# Patient Record
Sex: FEMALE | Race: WHITE | NOT HISPANIC OR LATINO | Employment: OTHER | URBAN - NONMETROPOLITAN AREA
[De-identification: names, ages, dates, MRNs, and addresses within clinical notes are randomized per-mention and may not be internally consistent; named-entity substitution may affect disease eponyms.]

---

## 2017-05-09 ENCOUNTER — TRANSCRIBE ORDERS (OUTPATIENT)
Dept: ADMINISTRATIVE | Facility: HOSPITAL | Age: 77
End: 2017-05-09

## 2017-05-09 DIAGNOSIS — Z12.31 VISIT FOR SCREENING MAMMOGRAM: Primary | ICD-10-CM

## 2017-05-12 ENCOUNTER — HOSPITAL ENCOUNTER (OUTPATIENT)
Dept: GENERAL RADIOLOGY | Facility: HOSPITAL | Age: 77
Discharge: HOME | End: 2017-05-12
Attending: INTERNAL MEDICINE | Admitting: INTERNAL MEDICINE

## 2017-05-12 ENCOUNTER — TRANSCRIBE ORDERS (OUTPATIENT)
Dept: LAB | Facility: HOSPITAL | Age: 77
End: 2017-05-12

## 2017-05-12 DIAGNOSIS — E83.52 HYPERCALCEMIA: Primary | ICD-10-CM

## 2017-05-12 DIAGNOSIS — E83.52 HYPERCALCEMIA: ICD-10-CM

## 2017-05-12 PROCEDURE — 71020 HC CHEST PA AND LATERAL: CPT

## 2017-05-12 PROCEDURE — 71020 XR CHEST PA AND LATERAL: CPT | Performed by: RADIOLOGY

## 2017-05-26 ENCOUNTER — HOSPITAL ENCOUNTER (OUTPATIENT)
Dept: MAMMOGRAPHY | Facility: HOSPITAL | Age: 77
Discharge: HOME | End: 2017-05-26
Attending: INTERNAL MEDICINE | Admitting: INTERNAL MEDICINE

## 2017-05-26 DIAGNOSIS — Z12.31 VISIT FOR SCREENING MAMMOGRAM: ICD-10-CM

## 2017-05-26 PROCEDURE — G0202 SCR MAMMO BI INCL CAD: HCPCS | Performed by: RADIOLOGY

## 2017-05-26 PROCEDURE — G0202 SCR MAMMO BI INCL CAD: HCPCS

## 2017-05-26 PROCEDURE — 77063 BREAST TOMOSYNTHESIS BI: CPT

## 2017-05-26 PROCEDURE — 77063 BREAST TOMOSYNTHESIS BI: CPT | Performed by: RADIOLOGY

## 2017-07-20 ENCOUNTER — OFFICE VISIT (OUTPATIENT)
Dept: CARDIOLOGY | Facility: CLINIC | Age: 77
End: 2017-07-20

## 2017-07-20 VITALS
HEART RATE: 57 BPM | HEIGHT: 60 IN | RESPIRATION RATE: 16 BRPM | SYSTOLIC BLOOD PRESSURE: 124 MMHG | BODY MASS INDEX: 30.04 KG/M2 | DIASTOLIC BLOOD PRESSURE: 75 MMHG | WEIGHT: 153 LBS

## 2017-07-20 DIAGNOSIS — R07.9 CHEST PAIN, UNSPECIFIED TYPE: Primary | ICD-10-CM

## 2017-07-20 DIAGNOSIS — I10 ESSENTIAL HYPERTENSION: ICD-10-CM

## 2017-07-20 PROCEDURE — 93000 ELECTROCARDIOGRAM COMPLETE: CPT | Performed by: INTERNAL MEDICINE

## 2017-07-20 PROCEDURE — 99213 OFFICE O/P EST LOW 20 MIN: CPT | Performed by: INTERNAL MEDICINE

## 2017-07-20 NOTE — PROGRESS NOTES
"Bridget Segal MD  Juan José Byrdjuhi  1940 07/20/2017    Patient Active Problem List   Diagnosis   • Dyslipidemia   • Hypertension   • Chest pain       Dear Bridget Segal MD:    Subjective     Juan José Meza is a 76 y.o. female with the problems as listed above, presents      History of Present Illness:Ms. Womack is a pleasant 75-year-old  female with history of chest pains but no documented significant coronary artery disease.  She is here for her regular cardiology follow-up.  She has not had any further significant chest pain since the last visit.  She denies any significant dyspnea, PND, orthopnea or pedal edema.  She has some mild palpitations at times.  Overall she's been doing well.        Allergies   Allergen Reactions   • No Known Drug Allergy    :      Current Outpatient Prescriptions:   •  pantoprazole (PROTONIX) 40 MG EC tablet, Take 40 mg by mouth daily., Disp: , Rfl:   •  Ped Multivitamins-Fl-Iron (MULTIVITAMIN WITH FLUORIDE/IRON) 0.25-10 MG/ML solution solution, Take  by mouth daily., Disp: , Rfl:       The following portions of the patient's history were reviewed and updated as appropriate: allergies, current medications, past family history, past medical history, past social history, past surgical history and problem list.    Social History   Substance Use Topics   • Smoking status: Never Smoker   • Smokeless tobacco: Never Used   • Alcohol use No       Review of Systems   Constitution: Negative for chills and fever.   HENT: Negative for headaches, nosebleeds and sore throat.    Respiratory: Negative for cough, hemoptysis and wheezing.    Gastrointestinal: Negative for abdominal pain, hematemesis, hematochezia, melena, nausea and vomiting.   Genitourinary: Negative for dysuria and hematuria.       Objective   Vitals:    07/20/17 1423   BP: 124/75   Pulse: 57   Resp: 16   Weight: 153 lb (69.4 kg)   Height: 60\" (152.4 cm)     Body mass index is 29.88 kg/(m^2).        Physical Exam "   Constitutional: She is oriented to person, place, and time. She appears well-developed and well-nourished.   HENT:   Head: Normocephalic.   Eyes: Conjunctivae and EOM are normal.   Neck: Normal range of motion. Neck supple. No JVD present. No tracheal deviation present. No thyromegaly present.   Cardiovascular: Normal rate and regular rhythm.  Exam reveals no gallop and no friction rub.    No murmur heard.  Pulmonary/Chest: Breath sounds normal. No respiratory distress. She has no wheezes. She has no rales.   Abdominal: Soft. Bowel sounds are normal. She exhibits no mass. There is no tenderness.   Musculoskeletal: She exhibits no edema.   Neurological: She is alert and oriented to person, place, and time. No cranial nerve deficit.   Skin: Skin is warm and dry.   Psychiatric: She has a normal mood and affect.       Echo   No results found for: ECHOEFEST    ECG 12 Lead  Date/Time: 7/20/2017 2:26 PM  Performed by: EDWIN HINDS  Authorized by: EDWIN HINDS   Rhythm: sinus rhythm  Conduction: conduction normal  ST Segments: ST segments normal  Other: no other findings              Assessment/Plan      1. Chest pain, unspecified type , Resolved.      2. Essential hypertension, Controlled.             Recommendations:    1. Continue same medications  2. Since she is doing well, I told her to follow-up with her primary M.D. Dr. Segal and will be glad to see her back as needed from the cardiac standpoint.       No Follow-up on file.    As always, I appreciate very much the opportunity to participate in the cardiovascular care of your patients.      With Best Regards,    Edwin Hinds MD, [facility]    Dragon disclaimer:  Much of this encounter note is an electronic transcription/translation of spoken language to printed text. The electronic translation of spoken language may permit erroneous, or at times, nonsensical words or phrases to be inadvertently transcribed; Although I have reviewed the note for such errors,  some may still exist.

## 2018-11-19 ENCOUNTER — HOSPITAL ENCOUNTER (OUTPATIENT)
Dept: MAMMOGRAPHY | Facility: HOSPITAL | Age: 78
Discharge: HOME | End: 2018-11-19
Admitting: INTERNAL MEDICINE

## 2018-11-19 DIAGNOSIS — Z12.39 SCREENING BREAST EXAMINATION: ICD-10-CM

## 2018-11-19 PROCEDURE — 77067 SCR MAMMO BI INCL CAD: CPT

## 2018-11-19 PROCEDURE — 77063 BREAST TOMOSYNTHESIS BI: CPT | Performed by: RADIOLOGY

## 2018-11-19 PROCEDURE — 77063 BREAST TOMOSYNTHESIS BI: CPT

## 2018-11-19 PROCEDURE — 77067 SCR MAMMO BI INCL CAD: CPT | Performed by: RADIOLOGY

## 2019-10-17 ENCOUNTER — APPOINTMENT (OUTPATIENT)
Dept: BONE DENSITY | Facility: HOSPITAL | Age: 79
End: 2019-10-17

## 2019-11-25 ENCOUNTER — HOSPITAL ENCOUNTER (OUTPATIENT)
Dept: MAMMOGRAPHY | Facility: HOSPITAL | Age: 79
End: 2019-11-25

## 2019-11-25 ENCOUNTER — HOSPITAL ENCOUNTER (OUTPATIENT)
Dept: BONE DENSITY | Facility: HOSPITAL | Age: 79
End: 2019-11-25

## 2019-12-18 ENCOUNTER — HOSPITAL ENCOUNTER (OUTPATIENT)
Dept: MAMMOGRAPHY | Facility: HOSPITAL | Age: 79
Discharge: HOME | End: 2019-12-18
Admitting: INTERNAL MEDICINE

## 2019-12-18 ENCOUNTER — HOSPITAL ENCOUNTER (OUTPATIENT)
Dept: BONE DENSITY | Facility: HOSPITAL | Age: 79
Discharge: HOME | End: 2019-12-18

## 2019-12-18 DIAGNOSIS — Z12.31 SCREENING MAMMOGRAM FOR HIGH-RISK PATIENT: ICD-10-CM

## 2019-12-18 DIAGNOSIS — M81.0 AGE-RELATED OSTEOPOROSIS WITHOUT CURRENT PATHOLOGICAL FRACTURE: ICD-10-CM

## 2019-12-18 PROCEDURE — 77063 BREAST TOMOSYNTHESIS BI: CPT

## 2019-12-18 PROCEDURE — 77080 DXA BONE DENSITY AXIAL: CPT | Performed by: RADIOLOGY

## 2019-12-18 PROCEDURE — 77067 SCR MAMMO BI INCL CAD: CPT | Performed by: RADIOLOGY

## 2019-12-18 PROCEDURE — 77080 DXA BONE DENSITY AXIAL: CPT

## 2019-12-18 PROCEDURE — 77063 BREAST TOMOSYNTHESIS BI: CPT | Performed by: RADIOLOGY

## 2019-12-18 PROCEDURE — 77067 SCR MAMMO BI INCL CAD: CPT

## 2020-07-01 ENCOUNTER — TRANSCRIBE ORDERS (OUTPATIENT)
Dept: ADMINISTRATIVE | Facility: HOSPITAL | Age: 80
End: 2020-07-01

## 2020-07-01 DIAGNOSIS — R79.89 HYPOURICEMIA: Primary | ICD-10-CM

## 2020-07-08 ENCOUNTER — APPOINTMENT (OUTPATIENT)
Dept: ULTRASOUND IMAGING | Facility: HOSPITAL | Age: 80
End: 2020-07-08

## 2020-07-21 ENCOUNTER — HOSPITAL ENCOUNTER (OUTPATIENT)
Dept: ULTRASOUND IMAGING | Facility: HOSPITAL | Age: 80
Discharge: HOME | End: 2020-07-21
Admitting: INTERNAL MEDICINE

## 2020-07-21 ENCOUNTER — TRANSCRIBE ORDERS (OUTPATIENT)
Dept: ADMINISTRATIVE | Facility: HOSPITAL | Age: 80
End: 2020-07-21

## 2020-07-21 ENCOUNTER — HOSPITAL ENCOUNTER (OUTPATIENT)
Dept: GENERAL RADIOLOGY | Facility: HOSPITAL | Age: 80
Discharge: HOME | End: 2020-07-21

## 2020-07-21 DIAGNOSIS — R06.02 SHORTNESS OF BREATH: ICD-10-CM

## 2020-07-21 DIAGNOSIS — R06.02 SHORTNESS OF BREATH: Primary | ICD-10-CM

## 2020-07-21 DIAGNOSIS — R79.89 HYPOURICEMIA: ICD-10-CM

## 2020-07-21 PROCEDURE — 71046 X-RAY EXAM CHEST 2 VIEWS: CPT | Performed by: RADIOLOGY

## 2020-07-21 PROCEDURE — 76700 US EXAM ABDOM COMPLETE: CPT | Performed by: RADIOLOGY

## 2020-07-21 PROCEDURE — 76700 US EXAM ABDOM COMPLETE: CPT

## 2020-07-21 PROCEDURE — 71046 X-RAY EXAM CHEST 2 VIEWS: CPT

## 2021-02-17 DIAGNOSIS — Z23 IMMUNIZATION DUE: ICD-10-CM

## 2021-04-09 ENCOUNTER — TRANSCRIBE ORDERS (OUTPATIENT)
Dept: ADMINISTRATIVE | Facility: HOSPITAL | Age: 81
End: 2021-04-09

## 2021-04-09 DIAGNOSIS — R31.9 HEMATURIA SYNDROME: Primary | ICD-10-CM

## 2021-04-12 ENCOUNTER — OFFICE VISIT (OUTPATIENT)
Dept: CARDIOLOGY | Facility: CLINIC | Age: 81
End: 2021-04-12

## 2021-04-12 VITALS
TEMPERATURE: 97.8 F | DIASTOLIC BLOOD PRESSURE: 68 MMHG | HEIGHT: 61 IN | HEART RATE: 61 BPM | RESPIRATION RATE: 16 BRPM | BODY MASS INDEX: 28.74 KG/M2 | WEIGHT: 152.2 LBS | SYSTOLIC BLOOD PRESSURE: 158 MMHG

## 2021-04-12 DIAGNOSIS — E78.2 MIXED HYPERLIPIDEMIA: ICD-10-CM

## 2021-04-12 DIAGNOSIS — I10 ESSENTIAL HYPERTENSION: ICD-10-CM

## 2021-04-12 DIAGNOSIS — R07.2 PRECORDIAL CHEST PAIN: Primary | ICD-10-CM

## 2021-04-12 DIAGNOSIS — R00.2 PALPITATIONS: ICD-10-CM

## 2021-04-12 PROCEDURE — 93000 ELECTROCARDIOGRAM COMPLETE: CPT | Performed by: SPECIALIST

## 2021-04-12 PROCEDURE — 99204 OFFICE O/P NEW MOD 45 MIN: CPT | Performed by: SPECIALIST

## 2021-04-12 RX ORDER — CETIRIZINE HYDROCHLORIDE 10 MG/1
10 TABLET ORAL DAILY
COMMUNITY

## 2021-04-12 RX ORDER — ASPIRIN 81 MG/1
81 TABLET ORAL DAILY
COMMUNITY

## 2021-04-12 RX ORDER — AMLODIPINE BESYLATE 5 MG/1
5 TABLET ORAL DAILY
Qty: 30 TABLET | Refills: 11 | Status: SHIPPED | OUTPATIENT
Start: 2021-04-12 | End: 2021-07-27 | Stop reason: SDUPTHER

## 2021-04-12 NOTE — PROGRESS NOTES
"Subjective   Initial consultation, chest pain, palpitations  Juan José Meza is a 80 y.o. female who presents to day for Med Management (list provided), Palpitations (fluttering), and Chest Pain (\"some\").    CHIEF COMPLIANT  Chief Complaint   Patient presents with   • Med Management     list provided   • Palpitations     fluttering   • Chest Pain     \"some\"       Active Problems:  Problem List Items Addressed This Visit        Cardiac and Vasculature    Precordial chest pain - Primary    Relevant Orders    ECG 12 Lead (Completed)    Stress Test With Myocardial Perfusion One Day    Adult Transthoracic Echo Complete w/ Color, Spectral and Contrast if necessary per protocol    Palpitations    Relevant Orders    Adult Transthoracic Echo Complete w/ Color, Spectral and Contrast if necessary per protocol    Cardiac Event Monitor    Essential hypertension    Relevant Medications    amLODIPine (NORVASC) 5 MG tablet    Other Relevant Orders    Comprehensive Metabolic Panel    Mixed hyperlipidemia    Relevant Orders    Lipid Panel    Comprehensive Metabolic Panel          HPI  HPI  For about 6 months or so she has been having episodes of palpitations which usually last for few seconds feels like the heart speeding up and pounding it feels regular and usually last for few seconds it happens on average about every 3 to 4 days usually not associated with symptoms denies and this is a pleasant syncope in 4/3 so months is having retrosternal chest discomfort does not radiate to his relatively short-lived no relationship to food or exertion happens about once or twice a week she denies any shortness of breath no edema never smoked he has hypertension also has hyperlipidemia no diabetes her father had a heart attack is a 75 mother also have cardiac problems unknown nature  PRIOR MEDS  Current Outpatient Medications on File Prior to Visit   Medication Sig Dispense Refill   • aspirin 81 MG EC tablet Take 81 mg by mouth Daily.     • " cetirizine (zyrTEC) 10 MG tablet Take 10 mg by mouth Daily.     • Cyanocobalamin (HM SUPER VITAMIN B12 PO) Take  by mouth Daily.     • Multiple Vitamins-Minerals (Emergen-C Immune) pack Take  by mouth As Needed.     • pantoprazole (PROTONIX) 40 MG EC tablet Take 40 mg by mouth daily.     • Ped Multivitamins-Fl-Iron (MULTIVITAMIN WITH FLUORIDE/IRON) 0.25-10 MG/ML solution solution Take  by mouth daily.       No current facility-administered medications on file prior to visit.       ALLERGIES  No known drug allergy    HISTORY  Past Medical History:   Diagnosis Date   • Chest pain    • Dyslipidemia    • Environmental allergies    • Hypertension        Social History     Socioeconomic History   • Marital status:      Spouse name: Not on file   • Number of children: Not on file   • Years of education: Not on file   • Highest education level: Not on file   Tobacco Use   • Smoking status: Never Smoker   • Smokeless tobacco: Never Used   Substance and Sexual Activity   • Alcohol use: No   • Drug use: No       Family History   Problem Relation Age of Onset   • Heart disease Mother    • Heart attack Mother    • Heart attack Father    • Heart disease Maternal Uncle    • Breast cancer Neg Hx        Review of Systems   Constitutional: Negative for activity change and appetite change.   HENT: Positive for hearing loss. Negative for congestion.    Respiratory: Positive for chest tightness. Negative for apnea, cough, choking, shortness of breath, wheezing and stridor.    Cardiovascular: Positive for palpitations. Negative for chest pain and leg swelling.   Gastrointestinal: Negative for abdominal distention and abdominal pain.   Endocrine: Negative for cold intolerance and heat intolerance.   Genitourinary: Positive for frequency.   Musculoskeletal: Negative for gait problem.   Skin: Negative for color change and pallor.   Neurological: Negative for dizziness.   Psychiatric/Behavioral: Negative for agitation and behavioral  "problems.       Objective     VITALS: /68 (BP Location: Left arm)   Pulse 61   Temp 97.8 °F (36.6 °C)   Resp 16   Ht 154.9 cm (61\")   Wt 69 kg (152 lb 3.2 oz)   BMI 28.76 kg/m²     LABS:   Lab Results (most recent)     None          IMAGING:   No Images in the past 120 days found..    EXAM:  Physical Exam  Vitals reviewed.   Constitutional:       Appearance: She is well-developed.   HENT:      Head: Normocephalic and atraumatic.   Eyes:      Pupils: Pupils are equal, round, and reactive to light.   Neck:      Thyroid: No thyromegaly.      Vascular: No JVD.   Cardiovascular:      Rate and Rhythm: Normal rate and regular rhythm.      Heart sounds: Normal heart sounds. No murmur heard.   No friction rub. No gallop.    Pulmonary:      Effort: Pulmonary effort is normal. No respiratory distress.      Breath sounds: Normal breath sounds. No stridor. No wheezing or rales.   Chest:      Chest wall: No tenderness.   Musculoskeletal:         General: No tenderness or deformity.      Cervical back: Neck supple.   Skin:     General: Skin is warm and dry.   Neurological:      Mental Status: She is alert and oriented to person, place, and time.      Cranial Nerves: No cranial nerve deficit.      Coordination: Coordination normal.         Procedure     ECG 12 Lead    Date/Time: 4/12/2021 1:29 PM  Performed by: Sunil Faye MD  Authorized by: Sunil Faye MD           EKG: NSR, with sinus arrhythmia, otherwise unremarkable EKG, compare with EKG on 7/20/17 no significant changes       Assessment/Plan     Diagnoses and all orders for this visit:    1. Precordial chest pain (Primary)  -     ECG 12 Lead  -     Stress Test With Myocardial Perfusion One Day; Future  -     Adult Transthoracic Echo Complete w/ Color, Spectral and Contrast if necessary per protocol; Future    2. Palpitations  -     Adult Transthoracic Echo Complete w/ Color, Spectral and Contrast if necessary per protocol; Future  -     Cardiac Event " Monitor; Future    3. Essential hypertension  -     amLODIPine (NORVASC) 5 MG tablet; Take 1 tablet by mouth Daily.  Dispense: 30 tablet; Refill: 11  -     Comprehensive Metabolic Panel; Future    4. Mixed hyperlipidemia  -     Lipid Panel; Future  -     Comprehensive Metabolic Panel; Future    1.  She has typical and atypical chest pain we will proceed with stress testing assessment of ischemia also get an echocardiogram to assess cardiac function wall motion valve morphology  2.  Regarding her palpitation concern with possible atrial fibrillation going to get an event monitor for assessment of arrhythmia as an echocardiogram  3.  She has hyperlipidemia she is not on a statin we will get lipid profile and consider adding statin her lipids from 2017 showed LDL of 140  4.  Her blood pressure is elevated today as well as at home as a have seen her blood pressure measurements at home unfortunately do not have any lab results assessment of her renal function and potassium somnolent started on diuretics we will start her on amlodipine 5 mg/day for blood pressure control this may have to be changed to beta-blockers if she does have an arrhythmia we will monitor for now    Return After stress test.    Juan José Meza  reports that she has never smoked. She has never used smokeless tobacco.. I have educated her on the risk of diseases from using tobacco products such as cancer, COPD and heart disease.           Advance Care Planning   ACP discussion was held with the patient during this visit. Patient has an advance directive (not in EMR), copy requested.     Patient's Body mass index is 28.76 kg/m². BMI is above normal parameters. Recommendations include: educational material and referral to primary care.           MEDS ORDERED DURING VISIT:  New Medications Ordered This Visit   Medications   • amLODIPine (NORVASC) 5 MG tablet     Sig: Take 1 tablet by mouth Daily.     Dispense:  30 tablet     Refill:  11       As always,  Bridget Segal MD  I appreciate very much the opportunity to participate in the cardiovascular care of your patients. Please do not hesitate to call me with any questions with regards to Juan José Meza evaluation and management.         This document has been electronically signed by Sunil Faye MD  April 12, 2021 14:52 EDT

## 2021-04-14 ENCOUNTER — HOSPITAL ENCOUNTER (OUTPATIENT)
Dept: CT IMAGING | Facility: HOSPITAL | Age: 81
Discharge: HOME | End: 2021-04-14
Admitting: INTERNAL MEDICINE

## 2021-04-14 DIAGNOSIS — R31.9 HEMATURIA SYNDROME: ICD-10-CM

## 2021-04-14 LAB — CREAT BLDA-MCNC: 0.8 MG/DL (ref 0.6–1.3)

## 2021-04-14 PROCEDURE — 25010000002 IOPAMIDOL 61 % SOLUTION: Performed by: INTERNAL MEDICINE

## 2021-04-14 PROCEDURE — 74177 CT ABD & PELVIS W/CONTRAST: CPT | Performed by: RADIOLOGY

## 2021-04-14 PROCEDURE — 74177 CT ABD & PELVIS W/CONTRAST: CPT

## 2021-04-14 PROCEDURE — 82565 ASSAY OF CREATININE: CPT

## 2021-04-14 RX ADMIN — IOPAMIDOL 86 ML: 612 INJECTION, SOLUTION INTRAVENOUS at 10:24

## 2021-04-22 ENCOUNTER — TELEPHONE (OUTPATIENT)
Dept: CARDIOLOGY | Facility: CLINIC | Age: 81
End: 2021-04-22

## 2021-04-22 NOTE — TELEPHONE ENCOUNTER
NO PRECERT REQUIRED FOR HEART MONITOR.  CAN COME BY AND HAVE IT PUT ON AS LONG AS WE HAVE ONE IN STOCK ON MONDAY, WEDNESDAY OR FRIDAYS, OR WE CAN HAVE ONE MAILED.      PATIENT AWARE.  WILL THINK IT OVER AND CALL US BACK

## 2021-04-30 ENCOUNTER — TELEPHONE (OUTPATIENT)
Dept: CARDIOLOGY | Facility: CLINIC | Age: 81
End: 2021-04-30

## 2021-05-04 ENCOUNTER — LAB (OUTPATIENT)
Dept: LAB | Facility: HOSPITAL | Age: 81
End: 2021-05-04

## 2021-05-04 ENCOUNTER — TRANSCRIBE ORDERS (OUTPATIENT)
Dept: ADMINISTRATIVE | Facility: HOSPITAL | Age: 81
End: 2021-05-04

## 2021-05-04 DIAGNOSIS — Z11.59 SPECIAL SCREENING EXAMINATION FOR UNSPECIFIED VIRAL DISEASE: Primary | ICD-10-CM

## 2021-05-04 DIAGNOSIS — Z11.59 SPECIAL SCREENING EXAMINATION FOR UNSPECIFIED VIRAL DISEASE: ICD-10-CM

## 2021-05-04 PROBLEM — U07.1 COVID-19: Status: ACTIVE | Noted: 2021-05-04

## 2021-05-04 LAB
FLUAV RNA RESP QL NAA+PROBE: NOT DETECTED
FLUBV RNA RESP QL NAA+PROBE: NOT DETECTED
SARS-COV-2 RNA RESP QL NAA+PROBE: DETECTED

## 2021-05-04 PROCEDURE — 87636 SARSCOV2 & INF A&B AMP PRB: CPT | Performed by: INTERNAL MEDICINE

## 2021-05-04 PROCEDURE — C9803 HOPD COVID-19 SPEC COLLECT: HCPCS

## 2021-05-04 RX ORDER — METHYLPREDNISOLONE SODIUM SUCCINATE 125 MG/2ML
125 INJECTION, POWDER, LYOPHILIZED, FOR SOLUTION INTRAMUSCULAR; INTRAVENOUS AS NEEDED
Status: CANCELLED | OUTPATIENT
Start: 2021-05-05

## 2021-05-04 RX ORDER — EPINEPHRINE 1 MG/ML
0.3 INJECTION, SOLUTION INTRAMUSCULAR; SUBCUTANEOUS AS NEEDED
Status: CANCELLED | OUTPATIENT
Start: 2021-05-05

## 2021-05-04 RX ORDER — DIPHENHYDRAMINE HYDROCHLORIDE 50 MG/ML
50 INJECTION INTRAMUSCULAR; INTRAVENOUS AS NEEDED
Status: CANCELLED | OUTPATIENT
Start: 2021-05-05

## 2021-05-05 ENCOUNTER — HOSPITAL ENCOUNTER (OUTPATIENT)
Dept: INFUSION THERAPY | Facility: HOSPITAL | Age: 81
Discharge: HOME | End: 2021-05-05
Admitting: INTERNAL MEDICINE

## 2021-05-05 VITALS
TEMPERATURE: 99.2 F | OXYGEN SATURATION: 98 % | HEART RATE: 60 BPM | RESPIRATION RATE: 18 BRPM | DIASTOLIC BLOOD PRESSURE: 66 MMHG | SYSTOLIC BLOOD PRESSURE: 150 MMHG

## 2021-05-05 DIAGNOSIS — U07.1 COVID-19: Primary | ICD-10-CM

## 2021-05-05 PROCEDURE — M0245 INACTIVE - HC IV INFUSION, BAMLANIVIMAB AND ETESEVIMAB, 2100 MG: HCPCS | Performed by: INTERNAL MEDICINE

## 2021-05-05 PROCEDURE — 25010000006 INJECTION, BAMLANIVIMAB AND ETESEVIMAB, 2100 MG: Performed by: INTERNAL MEDICINE

## 2021-05-05 PROCEDURE — M0245 HC INTRAVENOUS INFUSION, BAMLANIVIMAB AND ETESEVIMAB, 2100 MG: HCPCS | Performed by: INTERNAL MEDICINE

## 2021-05-05 RX ORDER — EPINEPHRINE 1 MG/ML
0.3 INJECTION, SOLUTION INTRAMUSCULAR; SUBCUTANEOUS AS NEEDED
Status: DISCONTINUED | OUTPATIENT
Start: 2021-05-05 | End: 2021-05-07 | Stop reason: HOSPADM

## 2021-05-05 RX ORDER — DIPHENHYDRAMINE HYDROCHLORIDE 50 MG/ML
50 INJECTION INTRAMUSCULAR; INTRAVENOUS AS NEEDED
OUTPATIENT
Start: 2021-05-05

## 2021-05-05 RX ORDER — METHYLPREDNISOLONE SODIUM SUCCINATE 125 MG/2ML
125 INJECTION, POWDER, LYOPHILIZED, FOR SOLUTION INTRAMUSCULAR; INTRAVENOUS AS NEEDED
Status: DISCONTINUED | OUTPATIENT
Start: 2021-05-05 | End: 2021-05-07 | Stop reason: HOSPADM

## 2021-05-05 RX ORDER — DIPHENHYDRAMINE HYDROCHLORIDE 50 MG/ML
50 INJECTION INTRAMUSCULAR; INTRAVENOUS AS NEEDED
Status: DISCONTINUED | OUTPATIENT
Start: 2021-05-05 | End: 2021-05-07 | Stop reason: HOSPADM

## 2021-05-05 RX ORDER — EPINEPHRINE 1 MG/ML
0.3 INJECTION, SOLUTION INTRAMUSCULAR; SUBCUTANEOUS AS NEEDED
OUTPATIENT
Start: 2021-05-05

## 2021-05-05 RX ORDER — METHYLPREDNISOLONE SODIUM SUCCINATE 125 MG/2ML
125 INJECTION, POWDER, LYOPHILIZED, FOR SOLUTION INTRAMUSCULAR; INTRAVENOUS AS NEEDED
OUTPATIENT
Start: 2021-05-05

## 2021-05-05 RX ADMIN — SODIUM CHLORIDE: 9 INJECTION, SOLUTION INTRAVENOUS at 08:53

## 2021-05-05 NOTE — NURSING NOTE
Medication completed, patient denies pain and no s/s of reaction, patient sitting in chair will continue to monitor

## 2021-05-05 NOTE — NURSING NOTE
Patient arrived, IV started, vital signs stable, denies pain, patient does report some diarrhea, bedside commode provided, called pharmacy for medication.

## 2021-05-05 NOTE — PATIENT INSTRUCTIONS
MD has discussed The FDA has authorized the emergency use of bamlanivimab, which is not an FDA approved drug. Discussions with the patient regarding the risks and benefits of bamlanivimab have occurred. The patient recognizes that this is an investigational treatment which may offer significant known and potential benefits and risks, the extent of which are unknown. Information on available alternative treatments and the risks and benefits of those alternatives was discussed. The patient received the “Fact Sheet for Patients Parents and Caregivers”. All questions from the patient were answered to satisfaction. The patient has the option to accept or refuse treatment with bamlanivimab and would like to accept treatment.  Education handouts have been given to patient.    Counseling regarding continued self isolation and use of infection control measures according to the CDC guidelines has occurred.

## 2021-05-05 NOTE — NURSING NOTE
Medication infusing, patient denies pain, vital signs stable will monitor while medication infusing.

## 2021-05-18 ENCOUNTER — APPOINTMENT (OUTPATIENT)
Dept: CARDIOLOGY | Facility: HOSPITAL | Age: 81
End: 2021-05-18

## 2021-05-18 ENCOUNTER — APPOINTMENT (OUTPATIENT)
Dept: NUCLEAR MEDICINE | Facility: HOSPITAL | Age: 81
End: 2021-05-18

## 2021-06-01 ENCOUNTER — TREATMENT (OUTPATIENT)
Dept: CARDIOLOGY | Facility: CLINIC | Age: 81
End: 2021-06-01

## 2021-06-01 DIAGNOSIS — R00.2 PALPITATIONS: ICD-10-CM

## 2021-06-01 PROCEDURE — 93228 REMOTE 30 DAY ECG REV/REPORT: CPT | Performed by: SPECIALIST

## 2021-06-07 ENCOUNTER — OFFICE VISIT (OUTPATIENT)
Dept: UROLOGY | Facility: CLINIC | Age: 81
End: 2021-06-07

## 2021-06-07 VITALS — HEIGHT: 61 IN | WEIGHT: 150 LBS | BODY MASS INDEX: 28.32 KG/M2

## 2021-06-07 DIAGNOSIS — R35.0 FREQUENCY OF URINATION: Primary | ICD-10-CM

## 2021-06-07 DIAGNOSIS — R31.29 MICROSCOPIC HEMATURIA: ICD-10-CM

## 2021-06-07 LAB
BILIRUB BLD-MCNC: ABNORMAL MG/DL
CLARITY, POC: CLEAR
COLOR UR: YELLOW
GLUCOSE UR STRIP-MCNC: NEGATIVE MG/DL
KETONES UR QL: NEGATIVE
LEUKOCYTE EST, POC: NEGATIVE
NITRITE UR-MCNC: NEGATIVE MG/ML
PH UR: 5.5 [PH] (ref 5–8)
PROT UR STRIP-MCNC: NEGATIVE MG/DL
RBC # UR STRIP: NEGATIVE /UL
SP GR UR: 1.03 (ref 1–1.03)
UROBILINOGEN UR QL: NORMAL

## 2021-06-07 PROCEDURE — 81003 URINALYSIS AUTO W/O SCOPE: CPT | Performed by: UROLOGY

## 2021-06-07 PROCEDURE — 99203 OFFICE O/P NEW LOW 30 MIN: CPT | Performed by: UROLOGY

## 2021-06-07 NOTE — PROGRESS NOTES
Chief Complaint:          Chief Complaint   Patient presents with   • Postive fish Test     New Patient        HPI:   80 y.o. female referred for evaluation of a positive FISH test.  She has no hematuria.  This was a wellness work-up by Dr. Segal she does not smoke.  She has a family history of cancer she has no  lower urinary tract symptomatology particularly irritative symptoms such as frequency urgency dysuria and obstructive symptomatology particularly dribbling, hesitancy, intermittency.  She is a  1 para 1 she has had a tubal ligation.  I will set her up for lower tract investigation I reviewed her CT scan which was done with contrast showing no abnormalities      Past Medical History:        Past Medical History:   Diagnosis Date   • Chest pain    • Dyslipidemia    • Environmental allergies    • Hypertension          Current Meds:     Current Outpatient Medications   Medication Sig Dispense Refill   • amLODIPine (NORVASC) 5 MG tablet Take 1 tablet by mouth Daily. 30 tablet 11   • aspirin 81 MG EC tablet Take 81 mg by mouth Daily.     • cetirizine (zyrTEC) 10 MG tablet Take 10 mg by mouth Daily.     • Cyanocobalamin (HM SUPER VITAMIN B12 PO) Take  by mouth Daily.     • Multiple Vitamins-Minerals (Emergen-C Immune) pack Take  by mouth As Needed.     • pantoprazole (PROTONIX) 40 MG EC tablet Take 40 mg by mouth daily.     • Ped Multivitamins-Fl-Iron (MULTIVITAMIN WITH FLUORIDE/IRON) 0.25-10 MG/ML solution solution Take  by mouth daily.       No current facility-administered medications for this visit.        Allergies:      Allergies   Allergen Reactions   • No Known Drug Allergy         Past Surgical History:     Past Surgical History:   Procedure Laterality Date   • CHOLECYSTECTOMY     • TONSILLECTOMY           Social History:     Social History     Socioeconomic History   • Marital status:      Spouse name: Not on file   • Number of children: Not on file   • Years of education: Not on file   •  Highest education level: Not on file   Tobacco Use   • Smoking status: Never Smoker   • Smokeless tobacco: Never Used   Substance and Sexual Activity   • Alcohol use: No   • Drug use: No       Family History:     Family History   Problem Relation Age of Onset   • Heart disease Mother    • Heart attack Mother    • Heart attack Father    • Heart disease Maternal Uncle    • Breast cancer Neg Hx        Review of Systems:     Review of Systems   Constitutional: Negative.  Negative for activity change, appetite change, chills, diaphoresis, fatigue and unexpected weight change.   HENT: Negative for congestion, dental problem, drooling, ear discharge, ear pain, facial swelling, hearing loss, mouth sores, nosebleeds, postnasal drip, rhinorrhea, sinus pressure, sneezing, sore throat, tinnitus, trouble swallowing and voice change.    Eyes: Negative.  Negative for photophobia, pain, discharge, redness, itching and visual disturbance.   Respiratory: Negative.  Negative for apnea, cough, choking, chest tightness, shortness of breath, wheezing and stridor.    Cardiovascular: Negative.  Negative for chest pain, palpitations and leg swelling.   Gastrointestinal: Negative.  Negative for abdominal distention, abdominal pain, anal bleeding, blood in stool, constipation, diarrhea, nausea, rectal pain and vomiting.   Endocrine: Negative.  Negative for cold intolerance, heat intolerance, polydipsia, polyphagia and polyuria.   Musculoskeletal: Negative.  Negative for arthralgias, back pain, gait problem, joint swelling, myalgias, neck pain and neck stiffness.   Skin: Negative.  Negative for color change, pallor, rash and wound.   Allergic/Immunologic: Negative.  Negative for environmental allergies, food allergies and immunocompromised state.   Neurological: Negative.  Negative for dizziness, tremors, seizures, syncope, facial asymmetry, speech difficulty, weakness, light-headedness, numbness and headaches.   Hematological: Negative.   Negative for adenopathy. Does not bruise/bleed easily.   Psychiatric/Behavioral: Negative for agitation, behavioral problems, confusion, decreased concentration, dysphoric mood, hallucinations, self-injury, sleep disturbance and suicidal ideas. The patient is not nervous/anxious and is not hyperactive.    All other systems reviewed and are negative.      Physical Exam:     Physical Exam  Constitutional:       Appearance: She is well-developed.   HENT:      Head: Normocephalic and atraumatic.      Right Ear: External ear normal.      Left Ear: External ear normal.   Eyes:      Conjunctiva/sclera: Conjunctivae normal.      Pupils: Pupils are equal, round, and reactive to light.   Cardiovascular:      Rate and Rhythm: Normal rate and regular rhythm.      Heart sounds: Normal heart sounds.   Pulmonary:      Effort: Pulmonary effort is normal.      Breath sounds: Normal breath sounds.   Abdominal:      General: Bowel sounds are normal. There is no distension.      Palpations: Abdomen is soft. There is no mass.      Tenderness: There is no abdominal tenderness. There is no guarding or rebound.   Genitourinary:     Vagina: No vaginal discharge.   Musculoskeletal:         General: Normal range of motion.   Skin:     General: Skin is warm and dry.   Neurological:      Mental Status: She is alert.      Deep Tendon Reflexes: Reflexes are normal and symmetric.   Psychiatric:         Behavior: Behavior normal.         Thought Content: Thought content normal.         Judgment: Judgment normal.         I have reviewed the following portions of the patient's history: allergies, current medications, past family history, past medical history, past social history, past surgical history, problem list and ROS and confirm it's accurate.      Procedure:       Assessment/Plan:   Positive FISH test-in the absence of hematuria with a negative upper tract study will complete a lower tract investigation to complete the work-up      Patient  reports that she is not currently experiencing any symptoms of urinary incontinence.                    This document has been electronically signed by LUCIA DANIELSON MD June 7, 2021 14:11 EDT

## 2021-06-14 ENCOUNTER — HOSPITAL ENCOUNTER (OUTPATIENT)
Dept: NUCLEAR MEDICINE | Facility: HOSPITAL | Age: 81
Discharge: HOME | End: 2021-06-14

## 2021-06-14 ENCOUNTER — HOSPITAL ENCOUNTER (OUTPATIENT)
Dept: CARDIOLOGY | Facility: HOSPITAL | Age: 81
Discharge: HOME | End: 2021-06-14

## 2021-06-14 DIAGNOSIS — R07.2 PRECORDIAL CHEST PAIN: ICD-10-CM

## 2021-06-14 DIAGNOSIS — R00.2 PALPITATIONS: ICD-10-CM

## 2021-06-14 LAB
BH CV ECHO MEAS - % IVS THICK: -0.83 %
BH CV ECHO MEAS - % LVPW THICK: 26 %
BH CV ECHO MEAS - ACS: 1.9 CM
BH CV ECHO MEAS - AO MAX PG (FULL): 11.7 MMHG
BH CV ECHO MEAS - AO MAX PG: 15.1 MMHG
BH CV ECHO MEAS - AO MEAN PG (FULL): 4.5 MMHG
BH CV ECHO MEAS - AO MEAN PG: 6.5 MMHG
BH CV ECHO MEAS - AO ROOT AREA (BSA CORRECTED): 1.7
BH CV ECHO MEAS - AO ROOT AREA: 6.6 CM^2
BH CV ECHO MEAS - AO ROOT DIAM: 2.9 CM
BH CV ECHO MEAS - AO V2 MAX: 194.5 CM/SEC
BH CV ECHO MEAS - AO V2 MEAN: 117.5 CM/SEC
BH CV ECHO MEAS - AO V2 VTI: 39.9 CM
BH CV ECHO MEAS - AVA(I,A): 1.6 CM^2
BH CV ECHO MEAS - AVA(I,D): 1.6 CM^2
BH CV ECHO MEAS - AVA(V,A): 1.3 CM^2
BH CV ECHO MEAS - AVA(V,D): 1.3 CM^2
BH CV ECHO MEAS - BSA(HAYCOCK): 1.7 M^2
BH CV ECHO MEAS - BSA: 1.7 M^2
BH CV ECHO MEAS - BZI_BMI: 28.3 KILOGRAMS/M^2
BH CV ECHO MEAS - BZI_METRIC_HEIGHT: 154.9 CM
BH CV ECHO MEAS - BZI_METRIC_WEIGHT: 68 KG
BH CV ECHO MEAS - EDV(CUBED): 74.4 ML
BH CV ECHO MEAS - EDV(MOD-SP4): 79.3 ML
BH CV ECHO MEAS - EDV(TEICH): 78.8 ML
BH CV ECHO MEAS - EF(CUBED): 69.5 %
BH CV ECHO MEAS - EF(MOD-SP4): 71.8 %
BH CV ECHO MEAS - EF(TEICH): 61.5 %
BH CV ECHO MEAS - ESV(CUBED): 22.7 ML
BH CV ECHO MEAS - ESV(MOD-SP4): 22.4 ML
BH CV ECHO MEAS - ESV(TEICH): 30.3 ML
BH CV ECHO MEAS - FS: 32.7 %
BH CV ECHO MEAS - IVS/LVPW: 1.2
BH CV ECHO MEAS - IVSD: 1.2 CM
BH CV ECHO MEAS - IVSS: 1.2 CM
BH CV ECHO MEAS - LA DIMENSION: 3.7 CM
BH CV ECHO MEAS - LA/AO: 1.3
BH CV ECHO MEAS - LV DIASTOLIC VOL/BSA (35-75): 47.4 ML/M^2
BH CV ECHO MEAS - LV MASS(C)D: 161.5 GRAMS
BH CV ECHO MEAS - LV MASS(C)DI: 96.6 GRAMS/M^2
BH CV ECHO MEAS - LV MASS(C)S: 107.7 GRAMS
BH CV ECHO MEAS - LV MASS(C)SI: 64.4 GRAMS/M^2
BH CV ECHO MEAS - LV MAX PG: 3.4 MMHG
BH CV ECHO MEAS - LV MEAN PG: 2 MMHG
BH CV ECHO MEAS - LV SYSTOLIC VOL/BSA (12-30): 13.4 ML/M^2
BH CV ECHO MEAS - LV V1 MAX: 92.5 CM/SEC
BH CV ECHO MEAS - LV V1 MEAN: 60.7 CM/SEC
BH CV ECHO MEAS - LV V1 VTI: 22.3 CM
BH CV ECHO MEAS - LVIDD: 4.2 CM
BH CV ECHO MEAS - LVIDS: 2.8 CM
BH CV ECHO MEAS - LVLD AP4: 6.7 CM
BH CV ECHO MEAS - LVLS AP4: 4.4 CM
BH CV ECHO MEAS - LVOT AREA (M): 2.8 CM^2
BH CV ECHO MEAS - LVOT AREA: 2.8 CM^2
BH CV ECHO MEAS - LVOT DIAM: 1.9 CM
BH CV ECHO MEAS - LVPWD: 1 CM
BH CV ECHO MEAS - LVPWS: 1.3 CM
BH CV ECHO MEAS - MV A MAX VEL: 84.8 CM/SEC
BH CV ECHO MEAS - MV E MAX VEL: 122 CM/SEC
BH CV ECHO MEAS - MV E/A: 1.4
BH CV ECHO MEAS - PA ACC TIME: 0.08 SEC
BH CV ECHO MEAS - PA PR(ACCEL): 44.4 MMHG
BH CV ECHO MEAS - RAP SYSTOLE: 10 MMHG
BH CV ECHO MEAS - RVSP: 48.9 MMHG
BH CV ECHO MEAS - SI(AO): 157.5 ML/M^2
BH CV ECHO MEAS - SI(CUBED): 30.9 ML/M^2
BH CV ECHO MEAS - SI(LVOT): 37.8 ML/M^2
BH CV ECHO MEAS - SI(MOD-SP4): 34 ML/M^2
BH CV ECHO MEAS - SI(TEICH): 29 ML/M^2
BH CV ECHO MEAS - SV(AO): 263.2 ML
BH CV ECHO MEAS - SV(CUBED): 51.7 ML
BH CV ECHO MEAS - SV(LVOT): 63.2 ML
BH CV ECHO MEAS - SV(MOD-SP4): 56.9 ML
BH CV ECHO MEAS - SV(TEICH): 48.5 ML
BH CV ECHO MEAS - TR MAX VEL: 312 CM/SEC
BH CV NUCLEAR PRIOR STUDY: 3
BH CV REST NUCLEAR ISOTOPE DOSE: 10.5 MCI
BH CV STRESS BP STAGE 1: NORMAL
BH CV STRESS BP STAGE 2: NORMAL
BH CV STRESS COMMENTS STAGE 1: NORMAL
BH CV STRESS COMMENTS STAGE 2: NORMAL
BH CV STRESS DOSE REGADENOSON STAGE 1: 0.4
BH CV STRESS DURATION MIN STAGE 1: 0
BH CV STRESS DURATION MIN STAGE 2: 4
BH CV STRESS DURATION SEC STAGE 1: 10
BH CV STRESS DURATION SEC STAGE 2: 0
BH CV STRESS HR STAGE 1: 91
BH CV STRESS HR STAGE 2: 102
BH CV STRESS NUCLEAR ISOTOPE DOSE: 30.5 MCI
BH CV STRESS PROTOCOL 1: NORMAL
BH CV STRESS RECOVERY BP: NORMAL MMHG
BH CV STRESS RECOVERY HR: 77 BPM
BH CV STRESS STAGE 1: 1
BH CV STRESS STAGE 2: 2
LV EF NUC BP: 90 %
MAXIMAL PREDICTED HEART RATE: 141 BPM
MAXIMAL PREDICTED HEART RATE: 141 BPM
PERCENT MAX PREDICTED HR: 72.34 %
STRESS BASELINE BP: NORMAL MMHG
STRESS BASELINE HR: 55 BPM
STRESS PERCENT HR: 85 %
STRESS POST PEAK BP: NORMAL MMHG
STRESS POST PEAK HR: 102 BPM
STRESS TARGET HR: 120 BPM
STRESS TARGET HR: 120 BPM

## 2021-06-14 PROCEDURE — 93306 TTE W/DOPPLER COMPLETE: CPT | Performed by: SPECIALIST

## 2021-06-14 PROCEDURE — 0 TECHNETIUM SESTAMIBI: Performed by: SPECIALIST

## 2021-06-14 PROCEDURE — 93306 TTE W/DOPPLER COMPLETE: CPT

## 2021-06-14 PROCEDURE — 25010000002 REGADENOSON 0.4 MG/5ML SOLUTION: Performed by: SPECIALIST

## 2021-06-14 PROCEDURE — A9500 TC99M SESTAMIBI: HCPCS | Performed by: SPECIALIST

## 2021-06-14 PROCEDURE — 78452 HT MUSCLE IMAGE SPECT MULT: CPT | Performed by: SPECIALIST

## 2021-06-14 PROCEDURE — 78452 HT MUSCLE IMAGE SPECT MULT: CPT

## 2021-06-14 PROCEDURE — 93017 CV STRESS TEST TRACING ONLY: CPT

## 2021-06-14 PROCEDURE — 93018 CV STRESS TEST I&R ONLY: CPT | Performed by: SPECIALIST

## 2021-06-14 RX ORDER — DOXYCYCLINE HYCLATE 100 MG/1
100 CAPSULE ORAL 2 TIMES DAILY
COMMUNITY
End: 2021-07-08

## 2021-06-14 RX ADMIN — TECHNETIUM TC 99M SESTAMIBI 1 DOSE: 1 INJECTION INTRAVENOUS at 12:26

## 2021-06-14 RX ADMIN — TECHNETIUM TC 99M SESTAMIBI 1 DOSE: 1 INJECTION INTRAVENOUS at 10:55

## 2021-06-14 RX ADMIN — REGADENOSON 0.4 MG: 0.08 INJECTION, SOLUTION INTRAVENOUS at 12:26

## 2021-06-24 PROBLEM — R31.29 MICROSCOPIC HEMATURIA: Status: ACTIVE | Noted: 2021-06-24

## 2021-07-08 ENCOUNTER — OFFICE VISIT (OUTPATIENT)
Dept: UROLOGY | Facility: CLINIC | Age: 81
End: 2021-07-08

## 2021-07-08 VITALS — BODY MASS INDEX: 28.32 KG/M2 | WEIGHT: 150 LBS | HEIGHT: 61 IN

## 2021-07-08 DIAGNOSIS — Z48.816 AFTERCARE FOLLOWING SURGERY OF THE GENITOURINARY SYSTEM: Primary | ICD-10-CM

## 2021-07-08 DIAGNOSIS — R31.29 MICROSCOPIC HEMATURIA: ICD-10-CM

## 2021-07-08 PROCEDURE — 96372 THER/PROPH/DIAG INJ SC/IM: CPT | Performed by: UROLOGY

## 2021-07-08 PROCEDURE — 52000 CYSTOURETHROSCOPY: CPT | Performed by: UROLOGY

## 2021-07-08 PROCEDURE — 99213 OFFICE O/P EST LOW 20 MIN: CPT | Performed by: UROLOGY

## 2021-07-08 RX ORDER — GENTAMICIN SULFATE 40 MG/ML
80 INJECTION, SOLUTION INTRAMUSCULAR; INTRAVENOUS ONCE
Status: COMPLETED | OUTPATIENT
Start: 2021-07-08 | End: 2021-07-08

## 2021-07-08 RX ADMIN — GENTAMICIN SULFATE 80 MG: 40 INJECTION, SOLUTION INTRAMUSCULAR; INTRAVENOUS at 15:09

## 2021-07-08 NOTE — PROGRESS NOTES
Chief Complaint:          Chief Complaint   Patient presents with   • Cysto for postive FISH       HPI:   80 y.o. female  referred for evaluation of a positive FISH test.  She has no hematuria.  This was a wellness work-up by Dr. Segal she does not smoke.  She has a family history of cancer she has no  lower urinary tract symptomatology particularly irritative symptoms such as frequency urgency dysuria and obstructive symptomatology particularly dribbling, hesitancy, intermittency.  She is a  1 para 1 she has had a tubal ligation.  I will set her up for lower tract investigation I reviewed her CT scan which was done with contrast showing no abnormalities.  She is here for cystoscopy      Past Medical History:        Past Medical History:   Diagnosis Date   • Chest pain    • Dyslipidemia    • Environmental allergies    • Hypertension          Current Meds:     Current Outpatient Medications   Medication Sig Dispense Refill   • amLODIPine (NORVASC) 5 MG tablet Take 1 tablet by mouth Daily. 30 tablet 11   • aspirin 81 MG EC tablet Take 81 mg by mouth Daily.     • cetirizine (zyrTEC) 10 MG tablet Take 10 mg by mouth Daily.     • Cyanocobalamin (HM SUPER VITAMIN B12 PO) Take  by mouth Daily.     • Multiple Vitamins-Minerals (Emergen-C Immune) pack Take  by mouth As Needed.     • pantoprazole (PROTONIX) 40 MG EC tablet Take 40 mg by mouth daily.       Current Facility-Administered Medications   Medication Dose Route Frequency Provider Last Rate Last Admin   • gentamicin (GARAMYCIN) injection 80 mg  80 mg Intramuscular Once Brayden Méndez MD            Allergies:      No Known Allergies     Past Surgical History:     Past Surgical History:   Procedure Laterality Date   • CHOLECYSTECTOMY     • TONSILLECTOMY           Social History:     Social History     Socioeconomic History   • Marital status:      Spouse name: Not on file   • Number of children: Not on file   • Years of education: Not on file    • Highest education level: Not on file   Tobacco Use   • Smoking status: Never Smoker   • Smokeless tobacco: Never Used   Substance and Sexual Activity   • Alcohol use: No   • Drug use: No       Family History:     Family History   Problem Relation Age of Onset   • Heart disease Mother    • Heart attack Mother    • Heart attack Father    • Heart disease Maternal Uncle    • Breast cancer Neg Hx        Review of Systems:     Review of Systems   Constitutional: Negative.  Negative for activity change, appetite change, chills, diaphoresis, fatigue and unexpected weight change.   HENT: Negative for congestion, dental problem, drooling, ear discharge, ear pain, facial swelling, hearing loss, mouth sores, nosebleeds, postnasal drip, rhinorrhea, sinus pressure, sneezing, sore throat, tinnitus, trouble swallowing and voice change.    Eyes: Negative.  Negative for photophobia, pain, discharge, redness, itching and visual disturbance.   Respiratory: Negative.  Negative for apnea, cough, choking, chest tightness, shortness of breath, wheezing and stridor.    Cardiovascular: Negative.  Negative for chest pain, palpitations and leg swelling.   Gastrointestinal: Negative.  Negative for abdominal distention, abdominal pain, anal bleeding, blood in stool, constipation, diarrhea, nausea, rectal pain and vomiting.   Endocrine: Negative.  Negative for cold intolerance, heat intolerance, polydipsia, polyphagia and polyuria.   Musculoskeletal: Negative.  Negative for arthralgias, back pain, gait problem, joint swelling, myalgias, neck pain and neck stiffness.   Skin: Negative.  Negative for color change, pallor, rash and wound.   Allergic/Immunologic: Negative.  Negative for environmental allergies, food allergies and immunocompromised state.   Neurological: Negative.  Negative for dizziness, tremors, seizures, syncope, facial asymmetry, speech difficulty, weakness, light-headedness, numbness and headaches.   Hematological: Negative.   Negative for adenopathy. Does not bruise/bleed easily.   Psychiatric/Behavioral: Negative for agitation, behavioral problems, confusion, decreased concentration, dysphoric mood, hallucinations, self-injury, sleep disturbance and suicidal ideas. The patient is not nervous/anxious and is not hyperactive.    All other systems reviewed and are negative.      Physical Exam:     Physical Exam  Constitutional:       Appearance: She is well-developed.   HENT:      Head: Normocephalic and atraumatic.      Right Ear: External ear normal.      Left Ear: External ear normal.   Eyes:      Conjunctiva/sclera: Conjunctivae normal.      Pupils: Pupils are equal, round, and reactive to light.   Cardiovascular:      Rate and Rhythm: Normal rate and regular rhythm.      Heart sounds: Normal heart sounds.   Pulmonary:      Effort: Pulmonary effort is normal.      Breath sounds: Normal breath sounds.   Abdominal:      General: Bowel sounds are normal. There is no distension.      Palpations: Abdomen is soft. There is no mass.      Tenderness: There is no abdominal tenderness. There is no guarding or rebound.   Genitourinary:     General: Normal vulva.      Vagina: No vaginal discharge.   Musculoskeletal:         General: Normal range of motion.   Skin:     General: Skin is warm and dry.   Neurological:      Mental Status: She is alert.      Deep Tendon Reflexes: Reflexes are normal and symmetric.   Psychiatric:         Behavior: Behavior normal.         Thought Content: Thought content normal.         Judgment: Judgment normal.         I have reviewed the following portions of the patient's history: allergies, current medications, past family history, past medical history, past social history, past surgical history, problem list and ROS and confirm it's accurate.      Procedure:   Cystoscopy:  Patient presents today for cystourethroscopy.  I went ahead and obtained an informed consent including the risk of anesthesia, bleeding,  infection, etc.  After prep and drape in a sterile fashion in the low dorsal lithotomy position the urethra was gently anesthetized with 10 cc of 2% viscous Xylocaine jelly.  After an appropriate period of topical anesthesia I used the Olympus digital 14 [place] flexible cystoscope to examine the anterior urethra which was completely normal, the ureteral orifices were visualized and normal in position and configuration there were no stones, tumors or foreign bodies.  The blue light was enabled and was negative allowing us to see small mucosal lesions. The patient was given 80 mg of gentamicin in an intramuscular fashion  as prophylaxis for the cystoscopy and released from the clinic.    Assessment/Plan:   Microscopic hematuria-upper and lower tract investigation negative.  FISH test was clearly a false positive      Patient reports that she is not currently experiencing any symptoms of urinary incontinence.                    This document has been electronically signed by LUCIA DANIELSON MD July 8, 2021 13:52 EDT

## 2021-07-27 ENCOUNTER — OFFICE VISIT (OUTPATIENT)
Dept: CARDIOLOGY | Facility: CLINIC | Age: 81
End: 2021-07-27

## 2021-07-27 VITALS
BODY MASS INDEX: 27.56 KG/M2 | WEIGHT: 146 LBS | SYSTOLIC BLOOD PRESSURE: 169 MMHG | DIASTOLIC BLOOD PRESSURE: 71 MMHG | HEIGHT: 61 IN | TEMPERATURE: 97.5 F | HEART RATE: 62 BPM

## 2021-07-27 DIAGNOSIS — E78.2 MIXED HYPERLIPIDEMIA: ICD-10-CM

## 2021-07-27 DIAGNOSIS — I10 ESSENTIAL HYPERTENSION: ICD-10-CM

## 2021-07-27 DIAGNOSIS — R07.2 PRECORDIAL CHEST PAIN: Primary | ICD-10-CM

## 2021-07-27 DIAGNOSIS — I35.0 NONRHEUMATIC AORTIC (VALVE) STENOSIS: ICD-10-CM

## 2021-07-27 DIAGNOSIS — R00.2 PALPITATIONS: ICD-10-CM

## 2021-07-27 PROCEDURE — 99214 OFFICE O/P EST MOD 30 MIN: CPT | Performed by: SPECIALIST

## 2021-07-27 RX ORDER — AMLODIPINE BESYLATE 5 MG/1
5 TABLET ORAL DAILY
Qty: 90 TABLET | Refills: 1 | Status: SHIPPED | OUTPATIENT
Start: 2021-07-27 | End: 2021-10-11 | Stop reason: SDUPTHER

## 2021-07-27 NOTE — PROGRESS NOTES
Subjective   Follow up, stress test result  Juan José Meza is a 80 y.o. female who presents to day for Follow-up (routine ) and Med Management (verbal ).    CHIEF COMPLIANT  Chief Complaint   Patient presents with   • Follow-up     routine    • Med Management     verbal        Active Problems:  Problem List Items Addressed This Visit        Cardiac and Vasculature    Precordial chest pain - Primary    Palpitations    Essential hypertension    Relevant Medications    amLODIPine (NORVASC) 5 MG tablet    Mixed hyperlipidemia    Nonrheumatic aortic (valve) stenosis    Relevant Medications    amLODIPine (NORVASC) 5 MG tablet          HPI  HPI  She has been having a lot of environmental allergies and will be this gave her some chest tightness but there is no actual chest pain and she still having palpitations this not happening frequently just occasional no edema denies shortness of breath at her level of exertion no dizziness  PRIOR MEDS  Current Outpatient Medications on File Prior to Visit   Medication Sig Dispense Refill   • aspirin 81 MG EC tablet Take 81 mg by mouth Daily.     • cetirizine (zyrTEC) 10 MG tablet Take 10 mg by mouth Daily.     • Cyanocobalamin (HM SUPER VITAMIN B12 PO) Take  by mouth Daily.     • Multiple Vitamins-Minerals (Emergen-C Immune) pack Take  by mouth As Needed.     • pantoprazole (PROTONIX) 40 MG EC tablet Take 40 mg by mouth daily.     • [DISCONTINUED] amLODIPine (NORVASC) 5 MG tablet Take 1 tablet by mouth Daily. 30 tablet 11     No current facility-administered medications on file prior to visit.       ALLERGIES  Patient has no known allergies.    HISTORY  Past Medical History:   Diagnosis Date   • Chest pain    • Dyslipidemia    • Environmental allergies    • Hypertension        Social History     Socioeconomic History   • Marital status:      Spouse name: Not on file   • Number of children: Not on file   • Years of education: Not on file   • Highest education level: Not on file  "  Tobacco Use   • Smoking status: Never Smoker   • Smokeless tobacco: Never Used   Substance and Sexual Activity   • Alcohol use: No   • Drug use: No       Family History   Problem Relation Age of Onset   • Heart disease Mother    • Heart attack Mother    • Heart attack Father    • Heart disease Maternal Uncle    • Breast cancer Neg Hx        Review of Systems   Constitutional: Negative for activity change and appetite change.   Respiratory: Positive for chest tightness. Negative for apnea, cough, choking, shortness of breath, wheezing and stridor.    Cardiovascular: Positive for palpitations. Negative for chest pain and leg swelling.       Objective     VITALS: /71   Pulse 62   Temp 97.5 °F (36.4 °C)   Ht 154.9 cm (60.98\")   Wt 66.2 kg (146 lb)   BMI 27.60 kg/m²     LABS:   Lab Results (most recent)     None          IMAGING:   CT Abdomen Pelvis With Contrast    Result Date: 4/14/2021   1. No obstructive uropathy or urolithiasis.  2. Other findings as discussed above.   3. moderate to large volume stool     This report was finalized on 4/14/2021 3:19 PM by Dr. Rosas Tariq MD.        EXAM:  Physical Exam  Vitals reviewed.   Constitutional:       Appearance: She is well-developed.   HENT:      Head: Normocephalic and atraumatic.   Eyes:      Pupils: Pupils are equal, round, and reactive to light.   Neck:      Thyroid: No thyromegaly.      Vascular: No JVD.   Cardiovascular:      Rate and Rhythm: Normal rate and regular rhythm.      Heart sounds: Normal heart sounds. No murmur heard.   No friction rub. No gallop.    Pulmonary:      Effort: Pulmonary effort is normal. No respiratory distress.      Breath sounds: Normal breath sounds. No stridor. No wheezing or rales.   Chest:      Chest wall: No tenderness.   Musculoskeletal:         General: No tenderness or deformity.      Cervical back: Neck supple.   Skin:     General: Skin is warm and dry.   Neurological:      Mental Status: She is alert and oriented " to person, place, and time.      Cranial Nerves: No cranial nerve deficit.      Coordination: Coordination normal.         Procedure   Procedures       Assessment/Plan     Diagnoses and all orders for this visit:    1. Precordial chest pain (Primary)    2. Essential hypertension  -     amLODIPine (NORVASC) 5 MG tablet; Take 1 tablet by mouth Daily.  Dispense: 90 tablet; Refill: 1    3. Palpitations    4. Mixed hyperlipidemia    5. Nonrheumatic aortic (valve) stenosis    1.  Her blood pressure is quite high today unfortunately has not started the hypertensive medications, apparently her primary doctor started her on losartan 50 mg but she has not started that either she has not also started the amlodipine I advised her to take both of these medications unfortunately I do not have any labs to know what her potassium and creatinine I assume this was normal as Dr. Segal started her on losartan I will try to get some effort to get the lab results  2.  We discussed the results of the echocardiogram which showed concentric left ventricle hypertrophy but with normal systolic function we will try to get better blood pressure control  3.  We discussed the results of the event monitor which was done for 2 weeks and that showed no arrhythmias we will continue current management  4.  Regarding her hyperlipidemia I do not have any lipid profile but apparently she told me that Dr. Segal a her primary care physician just started her on rosuvastatin 20 mg/day we will try to get the lipid profile  5.  We discussed the mild aortic valve stenosis seen on the echocardiogram this will be monitored      Return in about 4 weeks (around 8/24/2021).               MEDS ORDERED DURING VISIT:  New Medications Ordered This Visit   Medications   • amLODIPine (NORVASC) 5 MG tablet     Sig: Take 1 tablet by mouth Daily.     Dispense:  90 tablet     Refill:  1       As always, Bridget Segal MD  I appreciate very much the opportunity to  participate in the cardiovascular care of your patients. Please do not hesitate to call me with any questions with regards to Juan José Meza evaluation and management.         This document has been electronically signed by Sunil Faye MD  July 27, 2021 11:14 EDT

## 2021-09-01 ENCOUNTER — OFFICE VISIT (OUTPATIENT)
Dept: CARDIOLOGY | Facility: CLINIC | Age: 81
End: 2021-09-01

## 2021-09-01 VITALS
BODY MASS INDEX: 27.72 KG/M2 | DIASTOLIC BLOOD PRESSURE: 69 MMHG | HEART RATE: 53 BPM | TEMPERATURE: 97.3 F | SYSTOLIC BLOOD PRESSURE: 172 MMHG | HEIGHT: 61 IN | WEIGHT: 146.8 LBS

## 2021-09-01 DIAGNOSIS — I10 ESSENTIAL HYPERTENSION: Primary | ICD-10-CM

## 2021-09-01 DIAGNOSIS — R00.2 PALPITATIONS: ICD-10-CM

## 2021-09-01 DIAGNOSIS — E78.5 DYSLIPIDEMIA: ICD-10-CM

## 2021-09-01 PROCEDURE — 99214 OFFICE O/P EST MOD 30 MIN: CPT | Performed by: NURSE PRACTITIONER

## 2021-09-01 RX ORDER — ROSUVASTATIN CALCIUM 20 MG/1
20 TABLET, COATED ORAL DAILY
COMMUNITY
Start: 2021-07-26 | End: 2021-10-11 | Stop reason: ALTCHOICE

## 2021-09-01 RX ORDER — LOSARTAN POTASSIUM 50 MG/1
50 TABLET ORAL DAILY
COMMUNITY
Start: 2021-07-26 | End: 2021-10-11 | Stop reason: ALTCHOICE

## 2021-09-01 NOTE — PROGRESS NOTES
Bridget Segal MD  Juan José Meza  1940 09/01/2021    Patient Active Problem List   Diagnosis   • Dyslipidemia   • Hypertension   • Chest pain   • Precordial chest pain   • Palpitations   • Essential hypertension   • Mixed hyperlipidemia   • COVID-19   • Microscopic hematuria   • Nonrheumatic aortic (valve) stenosis       Dear Bridget Segal MD:    Subjective     Chief Complaint   Patient presents with   • Follow-up       History of Present Illness:    Juan José Meza is a 80 y.o. female with a past medical history of essential hypertension, palpitations and dyslipidemia.  Patient presents today for routine cardiology follow-up regarding her hypertension.  Patient states that since her last visit her palpitations have resolved and she has decreased her caffeine intake.  Denies any chest pain or shortness of breath.  She does report that she was prescribed losartan by her PCP and amlodipine by Dr. Faye but has not been taking these medications as she feels she does not need them.  She brings in a blood pressure log today which shows blood pressures consistently in the 140s systolic with occasional 130's. Patient states that she feels like she does not need these blood pressure medications as her blood pressure ranges from 130 to 140. She also reports she has not been taking her statin medication as she feels she does not need it. Echocardiogram showed normal LV function with mild concentric hypertrophy. Stress test showed no evidence of ischemia.     No Known Allergies:      Current Outpatient Medications:   •  aspirin 81 MG EC tablet, Take 81 mg by mouth Daily., Disp: , Rfl:   •  cetirizine (zyrTEC) 10 MG tablet, Take 10 mg by mouth Daily., Disp: , Rfl:   •  Cyanocobalamin (HM SUPER VITAMIN B12 PO), Take  by mouth Daily., Disp: , Rfl:   •  Multiple Vitamins-Minerals (Emergen-C Immune) pack, Take  by mouth As Needed., Disp: , Rfl:   •  pantoprazole (PROTONIX) 40 MG EC tablet, Take 40 mg by mouth  "daily., Disp: , Rfl:   •  amLODIPine (NORVASC) 5 MG tablet, Take 1 tablet by mouth Daily., Disp: 90 tablet, Rfl: 1  •  losartan (COZAAR) 50 MG tablet, Take 50 mg by mouth Daily., Disp: , Rfl:   •  rosuvastatin (CRESTOR) 20 MG tablet, Take 20 mg by mouth Daily., Disp: , Rfl:       The following portions of the patient's history were reviewed and updated as appropriate: allergies, current medications, past family history, past medical history, past social history, past surgical history and problem list.    Social History     Tobacco Use   • Smoking status: Never Smoker   • Smokeless tobacco: Never Used   Substance Use Topics   • Alcohol use: No   • Drug use: No     Objective   Vitals:    09/01/21 1247   BP: 172/69   Pulse: 53   Temp: 97.3 °F (36.3 °C)   Weight: 66.6 kg (146 lb 12.8 oz)   Height: 154.9 cm (61\")     Body mass index is 27.74 kg/m².    Constitutional:       Appearance: Healthy appearance. Well-developed.   Eyes:      Pupils: Pupils are equal, round, and reactive to light.   HENT:      Head: Normocephalic.   Neck:      Lymphadenopathy: No cervical adenopathy.   Pulmonary:      Effort: Pulmonary effort is normal. No respiratory distress.      Breath sounds: Normal breath sounds. No wheezing.   Cardiovascular:      Normal rate. Regular rhythm. Normal S1. Normal S2.      . No S3 and S4 gallop.   Pulses:     Intact distal pulses.   Abdominal:      General: Bowel sounds are normal. There is no distension.      Palpations: Abdomen is soft.      Tenderness: There is no abdominal tenderness.   Musculoskeletal:      Cervical back: Normal range of motion. Skin:     General: Skin is warm and dry.      Findings: No erythema.   Neurological:      Mental Status: Alert, oriented to person, place, and time and oriented to person, place and time.   Psychiatric:         Attention and Perception: Attention normal.         Mood and Affect: Mood normal.         Behavior: Behavior normal.         Thought Content: Thought " content normal.         Lab Results   Component Value Date    CREATININE 0.80 04/14/2021       Assessment/Plan    Diagnosis Plan   1. Essential hypertension     2. Palpitations     3. Dyslipidemia              Recommendations:    1. Essential hypertension  · Patient reports she has not been taking her blood pressure medications at home as she feels her blood pressure is controlled. Discussed with her about the need for blood pressure medication as her blood pressure is high today in the office and has been running consistently in the upper 140's systolic. She reports she does not like to take medications therefore we agreed to start her on amlodipine 5 mg PO daily for now to see if this will control her blood pressure and will adjust accordingly. She is to bring in a blood pressure log with her to her next visit and call with any concerns.   · Recent labs have been requested from her PCP.    2. Dyslipidemia  · Patient was prescribed a statin from her PCP however she has not been taking this.  She states that she recently had lab work done at her PCP office therefore will request these records and review her lipid panel. If lipid panel was not performed will need to order one.     3.  Palpitations  · Recent event monitor was negative for arrhythmias.  She states that her palpitations have improved since decreasing her caffeine intake.    Return in about 5 weeks (around 10/6/2021).    As always, I appreciate very much the opportunity to participate in the cardiovascular care of your patients.      With Best Regards,    MARCIAL Ness

## 2021-09-08 ENCOUNTER — TELEPHONE (OUTPATIENT)
Dept: CARDIOLOGY | Facility: CLINIC | Age: 81
End: 2021-09-08

## 2021-09-08 NOTE — TELEPHONE ENCOUNTER
----- Message from MARCIAL Almazan sent at 9/1/2021  1:46 PM EDT -----  Please request recent labs from PCP

## 2021-10-11 ENCOUNTER — OFFICE VISIT (OUTPATIENT)
Dept: CARDIOLOGY | Facility: CLINIC | Age: 81
End: 2021-10-11

## 2021-10-11 VITALS
SYSTOLIC BLOOD PRESSURE: 151 MMHG | HEART RATE: 63 BPM | WEIGHT: 149.6 LBS | TEMPERATURE: 97.1 F | DIASTOLIC BLOOD PRESSURE: 67 MMHG | RESPIRATION RATE: 16 BRPM | HEIGHT: 59 IN | BODY MASS INDEX: 30.16 KG/M2

## 2021-10-11 DIAGNOSIS — R00.2 PALPITATIONS: ICD-10-CM

## 2021-10-11 DIAGNOSIS — E78.5 DYSLIPIDEMIA: ICD-10-CM

## 2021-10-11 DIAGNOSIS — I10 ESSENTIAL HYPERTENSION: Primary | ICD-10-CM

## 2021-10-11 PROCEDURE — 93000 ELECTROCARDIOGRAM COMPLETE: CPT | Performed by: SPECIALIST

## 2021-10-11 PROCEDURE — 99213 OFFICE O/P EST LOW 20 MIN: CPT | Performed by: SPECIALIST

## 2021-10-11 RX ORDER — AMLODIPINE BESYLATE 5 MG/1
5 TABLET ORAL DAILY
Qty: 90 TABLET | Refills: 1 | Status: SHIPPED | OUTPATIENT
Start: 2021-10-11 | End: 2022-04-13 | Stop reason: SDUPTHER

## 2021-10-11 NOTE — PROGRESS NOTES
Subjective   Follow up, hypertension  Juan José Meza is a 80 y.o. female who presents to day for Palpitations (5 weeks follow) and Med Management (list provided).    CHIEF COMPLIANT  Chief Complaint   Patient presents with   • Palpitations     5 weeks follow   • Med Management     list provided       Active Problems:  Problem List Items Addressed This Visit        Cardiac and Vasculature    Dyslipidemia    Relevant Orders    Lipid Panel    Comprehensive Metabolic Panel    Palpitations    Essential hypertension - Primary    Relevant Medications    amLODIPine (NORVASC) 5 MG tablet          HPI  HPI  She is doing a lot better blood pressures well controlled at home she said no chest pain no palpitations no edema no dyspnea but she does have environmental allergies  PRIOR MEDS  Current Outpatient Medications on File Prior to Visit   Medication Sig Dispense Refill   • Multiple Vitamins-Minerals (Emergen-C Immune) pack Take  by mouth As Needed.     • [DISCONTINUED] amLODIPine (NORVASC) 5 MG tablet Take 1 tablet by mouth Daily. 90 tablet 1   • aspirin 81 MG EC tablet Take 81 mg by mouth Daily.     • cetirizine (zyrTEC) 10 MG tablet Take 10 mg by mouth Daily.     • Cyanocobalamin (HM SUPER VITAMIN B12 PO) Take  by mouth Daily.     • pantoprazole (PROTONIX) 40 MG EC tablet Take 40 mg by mouth daily.     • [DISCONTINUED] losartan (COZAAR) 50 MG tablet Take 50 mg by mouth Daily.     • [DISCONTINUED] rosuvastatin (CRESTOR) 20 MG tablet Take 20 mg by mouth Daily.       No current facility-administered medications on file prior to visit.       ALLERGIES  Patient has no known allergies.    HISTORY  Past Medical History:   Diagnosis Date   • Chest pain    • Dyslipidemia    • Environmental allergies    • Hypertension        Social History     Socioeconomic History   • Marital status:    Tobacco Use   • Smoking status: Never Smoker   • Smokeless tobacco: Never Used   Substance and Sexual Activity   • Alcohol use: No   • Drug  "use: No       Family History   Problem Relation Age of Onset   • Heart disease Mother    • Heart attack Mother    • Heart attack Father    • Heart disease Maternal Uncle    • Breast cancer Neg Hx        Review of Systems   Respiratory: Negative for apnea, cough, choking, chest tightness, shortness of breath, wheezing and stridor.    Cardiovascular: Negative for chest pain, palpitations and leg swelling.       Objective     VITALS: /67   Pulse 63   Temp 97.1 °F (36.2 °C)   Resp 16   Ht 149.9 cm (59\")   Wt 67.9 kg (149 lb 9.6 oz)   BMI 30.22 kg/m²     LABS:   Lab Results (most recent)     None          IMAGING:   No Images in the past 120 days found..    EXAM:  Physical Exam  Vitals reviewed.   Constitutional:       Appearance: She is well-developed.   HENT:      Head: Normocephalic and atraumatic.   Eyes:      Pupils: Pupils are equal, round, and reactive to light.   Neck:      Thyroid: No thyromegaly.      Vascular: No JVD.   Cardiovascular:      Rate and Rhythm: Normal rate and regular rhythm.      Heart sounds: Normal heart sounds. No murmur heard.  No friction rub. No gallop.    Pulmonary:      Effort: Pulmonary effort is normal. No respiratory distress.      Breath sounds: Normal breath sounds. No stridor. No wheezing or rales.   Chest:      Chest wall: No tenderness.   Musculoskeletal:         General: No tenderness or deformity.      Cervical back: Neck supple.   Skin:     General: Skin is warm and dry.   Neurological:      Mental Status: She is alert and oriented to person, place, and time.      Cranial Nerves: No cranial nerve deficit.      Coordination: Coordination normal.         Procedure     ECG 12 Lead    Date/Time: 10/11/2021 3:07 PM  Performed by: Sunil Faye MD  Authorized by: Sunil Faye MD           EKG: Normal sinus rhythm with a repolarization otherwise unremarkable EKG compared with EKG on 4/12/2021 no significant change       Assessment/Plan     Diagnoses and all orders for " this visit:    1. Essential hypertension (Primary)  -     amLODIPine (NORVASC) 5 MG tablet; Take 1 tablet by mouth Daily.  Dispense: 90 tablet; Refill: 1    2. Dyslipidemia  -     Lipid Panel; Future  -     Comprehensive Metabolic Panel; Future    3. Palpitations    Other orders  -     ECG 12 Lead      1.  Her blood pressure here in the office is elevated but I reviewed her blood pressure measurements at home which are well controlled she is tolerating amlodipine 5 mg/day otherwise no issues  2.  No further palpitations she was taking phenylephrine for allergies and she quit taking that and since then she has no further palpitations  3.  Again I do not have any lab results try to get the lab records from her primary care physician she is is not taking rosuvastatin I will give her papers to check her lipids prior to next visit  Return in about 6 months (around 4/11/2022).               MEDS ORDERED DURING VISIT:  New Medications Ordered This Visit   Medications   • amLODIPine (NORVASC) 5 MG tablet     Sig: Take 1 tablet by mouth Daily.     Dispense:  90 tablet     Refill:  1       As always, Bridget Segal MD  I appreciate very much the opportunity to participate in the cardiovascular care of your patients. Please do not hesitate to call me with any questions with regards to Juan José Meza evaluation and management.         This document has been electronically signed by Sunil Faye MD  October 11, 2021 15:30 EDT

## 2022-04-13 ENCOUNTER — OFFICE VISIT (OUTPATIENT)
Dept: CARDIOLOGY | Facility: CLINIC | Age: 82
End: 2022-04-13

## 2022-04-13 VITALS
WEIGHT: 151.8 LBS | OXYGEN SATURATION: 96 % | DIASTOLIC BLOOD PRESSURE: 66 MMHG | HEART RATE: 64 BPM | BODY MASS INDEX: 28.66 KG/M2 | SYSTOLIC BLOOD PRESSURE: 155 MMHG | TEMPERATURE: 96.9 F | HEIGHT: 61 IN

## 2022-04-13 DIAGNOSIS — E78.5 DYSLIPIDEMIA: Primary | ICD-10-CM

## 2022-04-13 DIAGNOSIS — I10 ESSENTIAL HYPERTENSION: ICD-10-CM

## 2022-04-13 PROCEDURE — 99213 OFFICE O/P EST LOW 20 MIN: CPT | Performed by: NURSE PRACTITIONER

## 2022-04-13 PROCEDURE — 93000 ELECTROCARDIOGRAM COMPLETE: CPT | Performed by: NURSE PRACTITIONER

## 2022-04-13 RX ORDER — AMLODIPINE BESYLATE 5 MG/1
5 TABLET ORAL DAILY
Qty: 90 TABLET | Refills: 2 | Status: SHIPPED | OUTPATIENT
Start: 2022-04-13 | End: 2022-09-21 | Stop reason: SDUPTHER

## 2022-04-13 NOTE — PROGRESS NOTES
Bridget Segal MD  Juan José Meza  1940 04/13/2022    Patient Active Problem List   Diagnosis   • Dyslipidemia   • Hypertension   • Chest pain   • Precordial chest pain   • Palpitations   • Essential hypertension   • Mixed hyperlipidemia   • COVID-19   • Microscopic hematuria   • Nonrheumatic aortic (valve) stenosis       Dear Bridget Segal MD:    Subjective     Chief Complaint   Patient presents with   • Follow-up     6MO F/U, LABS       History of Present Illness:    Juan José Meza is a 81 y.o. female with a past medical history of essential hypertension and dyslipidemia. Patient presents today for routine cardiology follow-up regarding her hypertension.  She states she has been doing overall well since her last visit.  Denies any chest pain or shortness of breath.  She was having palpitations however after cutting out caffeine her palpitations have resolved.  Blood pressure uncontrolled in the office today.  Patient does report that she has not been taking her blood pressure medication as prescribed as she felt she does not need it.  She also has been not been taking her cholesterol medicine.  Echocardiogram in April 2021 showed normal LV function with mild concentric hypertrophy.  She had a left heart catheterization in 2017 that showed mild plaquing in the LAD but otherwise unremarkable.    No Known Allergies:      Current Outpatient Medications:   •  amLODIPine (NORVASC) 5 MG tablet, Take 1 tablet by mouth Daily., Disp: 90 tablet, Rfl: 2  •  aspirin 81 MG EC tablet, Take 81 mg by mouth Daily., Disp: , Rfl:   •  Cyanocobalamin (HM SUPER VITAMIN B12 PO), Take  by mouth Daily., Disp: , Rfl:   •  Multiple Vitamins-Minerals (Emergen-C Immune) pack, Take  by mouth As Needed., Disp: , Rfl:   •  cetirizine (zyrTEC) 10 MG tablet, Take 10 mg by mouth Daily., Disp: , Rfl:   •  pantoprazole (PROTONIX) 40 MG EC tablet, Take 40 mg by mouth daily., Disp: , Rfl:       The following portions of the patient's  "history were reviewed and updated as appropriate: allergies, current medications, past family history, past medical history, past social history, past surgical history and problem list.    Social History     Tobacco Use   • Smoking status: Never Smoker   • Smokeless tobacco: Never Used   Substance Use Topics   • Alcohol use: No   • Drug use: No     Objective   Vitals:    04/13/22 1334   BP: 155/66   BP Location: Right arm   Patient Position: Sitting   Pulse: 64   Temp: 96.9 °F (36.1 °C)   SpO2: 96%   Weight: 68.9 kg (151 lb 12.8 oz)   Height: 154.9 cm (61\")     Body mass index is 28.68 kg/m².    Constitutional:       Appearance: Healthy appearance. Well-developed.   Eyes:      Pupils: Pupils are equal, round, and reactive to light.   HENT:      Head: Normocephalic.   Neck:      Lymphadenopathy: No cervical adenopathy.   Pulmonary:      Effort: Pulmonary effort is normal. No respiratory distress.      Breath sounds: Normal breath sounds. No wheezing.   Cardiovascular:      Normal rate. Regular rhythm. Normal S1. Normal S2.      . No S3 and S4 gallop.   Pulses:     Intact distal pulses.   Abdominal:      General: Bowel sounds are normal. There is no distension.      Palpations: Abdomen is soft.      Tenderness: There is no abdominal tenderness.   Musculoskeletal:      Cervical back: Normal range of motion. Skin:     General: Skin is warm and dry.      Findings: No erythema.   Neurological:      Mental Status: Alert, oriented to person, place, and time and oriented to person, place and time.   Psychiatric:         Attention and Perception: Attention normal.         Mood and Affect: Mood normal.         Behavior: Behavior normal.         Thought Content: Thought content normal.         Lab Results   Component Value Date    CREATININE 0.80 04/14/2021         ECG 12 Lead    Date/Time: 4/13/2022 1:44 PM  Performed by: Brianda Jovel APRN  Authorized by: Brianda Jovel APRN   Comparison: compared with " previous ECG   Similar to previous ECG  Rhythm: sinus rhythm  ST Segments: ST segments normal  T Waves: T waves normal    Clinical impression: normal ECG          Assessment/Plan    Diagnosis Plan   1. Dyslipidemia     2. Essential hypertension  amLODIPine (NORVASC) 5 MG tablet       Recommendations:    1. Essential hypertension  · Patient reports she has not been taking her amlodipine because she felt like she did not need it.  Had a long discussion with patient regarding uncontrolled hypertension and the effects that it has on the heart.  Blood pressure is high in the office today.  Patient is agreeable to take amlodipine 5 mg p.o. daily and keep a blood pressure log to bring into her next visit.  Instructed patient that if blood pressure remains elevated despite taking 5 mg amlodipine daily that she needs to call the office and we can adjust as needed.  · Request recent labs from PCP to evaluate kidney function.    2.  Dyslipidemia  · Patient has known dyslipidemia but does not take her statin as she feels she does not need it.  Once again had long discussion with patient regarding lipid panel from October 2021 that shows high cholesterol.  Will request recent labs from PCP and if lipid panel was not performed will order lipid panel to be done.  Patient would like to hold off on starting statin until lipid panel is reviewed.    Return in about 3 months (around 7/13/2022).    As always, I appreciate very much the opportunity to participate in the cardiovascular care of your patients.      With Best Regards,    MARCIAL Ness

## 2022-04-14 ENCOUNTER — TELEPHONE (OUTPATIENT)
Dept: CARDIOLOGY | Facility: CLINIC | Age: 82
End: 2022-04-14

## 2022-04-14 NOTE — TELEPHONE ENCOUNTER
Requested.    ----- Message from MARCIAL Almazan sent at 4/13/2022  2:16 PM EDT -----  Please request labs from PCP

## 2022-04-15 ENCOUNTER — APPOINTMENT (OUTPATIENT)
Dept: MAMMOGRAPHY | Facility: HOSPITAL | Age: 82
End: 2022-04-15

## 2022-04-15 ENCOUNTER — APPOINTMENT (OUTPATIENT)
Dept: BONE DENSITY | Facility: HOSPITAL | Age: 82
End: 2022-04-15

## 2022-04-25 RX ORDER — ROSUVASTATIN CALCIUM 20 MG/1
20 TABLET, COATED ORAL DAILY
Qty: 30 TABLET | Refills: 5 | Status: SHIPPED | OUTPATIENT
Start: 2022-04-25 | End: 2022-09-21 | Stop reason: SDUPTHER

## 2022-05-13 ENCOUNTER — HOSPITAL ENCOUNTER (OUTPATIENT)
Dept: BONE DENSITY | Facility: HOSPITAL | Age: 82
Discharge: HOME | End: 2022-05-13

## 2022-05-13 ENCOUNTER — HOSPITAL ENCOUNTER (OUTPATIENT)
Dept: MAMMOGRAPHY | Facility: HOSPITAL | Age: 82
Discharge: HOME | End: 2022-05-13

## 2022-05-13 DIAGNOSIS — Z12.31 VISIT FOR SCREENING MAMMOGRAM: ICD-10-CM

## 2022-05-13 DIAGNOSIS — M81.0 OSTEOPOROSIS, POST-MENOPAUSAL: ICD-10-CM

## 2022-05-13 PROCEDURE — 77080 DXA BONE DENSITY AXIAL: CPT

## 2022-05-13 PROCEDURE — 77063 BREAST TOMOSYNTHESIS BI: CPT

## 2022-05-13 PROCEDURE — 77063 BREAST TOMOSYNTHESIS BI: CPT | Performed by: RADIOLOGY

## 2022-05-13 PROCEDURE — 77067 SCR MAMMO BI INCL CAD: CPT | Performed by: RADIOLOGY

## 2022-05-13 PROCEDURE — 77067 SCR MAMMO BI INCL CAD: CPT

## 2022-05-13 PROCEDURE — 77080 DXA BONE DENSITY AXIAL: CPT | Performed by: RADIOLOGY

## 2022-09-21 ENCOUNTER — OFFICE VISIT (OUTPATIENT)
Dept: CARDIOLOGY | Facility: CLINIC | Age: 82
End: 2022-09-21

## 2022-09-21 VITALS
HEART RATE: 61 BPM | HEIGHT: 61 IN | RESPIRATION RATE: 16 BRPM | BODY MASS INDEX: 28.96 KG/M2 | SYSTOLIC BLOOD PRESSURE: 142 MMHG | WEIGHT: 153.4 LBS | DIASTOLIC BLOOD PRESSURE: 65 MMHG

## 2022-09-21 DIAGNOSIS — E78.5 DYSLIPIDEMIA: Primary | ICD-10-CM

## 2022-09-21 DIAGNOSIS — I10 ESSENTIAL HYPERTENSION: ICD-10-CM

## 2022-09-21 PROCEDURE — 99213 OFFICE O/P EST LOW 20 MIN: CPT | Performed by: NURSE PRACTITIONER

## 2022-09-21 RX ORDER — ROSUVASTATIN CALCIUM 20 MG/1
20 TABLET, COATED ORAL DAILY
Qty: 30 TABLET | Refills: 5 | Status: SHIPPED | OUTPATIENT
Start: 2022-09-21

## 2022-09-21 RX ORDER — AMLODIPINE BESYLATE 5 MG/1
5 TABLET ORAL DAILY
Qty: 90 TABLET | Refills: 2 | Status: SHIPPED | OUTPATIENT
Start: 2022-09-21

## 2022-09-21 NOTE — PROGRESS NOTES
[facility]             [facility] MARCIAL Jovel Maria C., MD  Juan José Meza  1940 09/21/2022    Patient Active Problem List   Diagnosis   • Dyslipidemia   • Hypertension   • Chest pain   • Precordial chest pain   • Palpitations   • Essential hypertension   • Mixed hyperlipidemia   • COVID-19   • Microscopic hematuria   • Nonrheumatic aortic (valve) stenosis       Dear Bridget Segal MD:    Subjective     Chief Complaint   Patient presents with   • Chest Pain     5 mos follow, no recent sx's   • Med Management     List provided     HPI:     This is a 81 y.o. female with known past medical history of essential hypertension nonobstructive coronary artery disease and dyslipidemia.    Juan José Meza presents for today for routine follow-up.  Patient states she has been doing overall well since her last visit.  She continues to only take her blood pressure medicine when she remembers.  Did not take her cholesterol medicine as advised.  She brings in a home blood pressure log which shows systolic blood pressure ranging from 140s to 150s.  Recent lipid panel from her PCP office showed elevated LDL at 138.  Denies any chest pain, shortness of breath, lower extremity edema or palpitations.    • Diagnostic Testing  1. Left heart catheterization 2017: Mild plaquing in the LAD otherwise unremarkable  2. Cardiac event monitor April 2021: Normal sinus rhythm with no arrhythmias noted  3. Echocardiogram 6/2021: EF greater than 70% with mild concentric hypertrophy  4. Nuclear stress test 6/2021: Mild fixed with mid anterior wall defect with normal wall motion with no significant reversibility consistent with breast attenuation artifact with no ischemia.    Review of Systems - Review of Systems   All other systems reviewed and are negative.        All other systems were reviewed and were negative.    Patient Active Problem List   Diagnosis   • Dyslipidemia   •  Hypertension   • Chest pain   • Precordial chest pain   • Palpitations   • Essential hypertension   • Mixed hyperlipidemia   • COVID-19   • Microscopic hematuria   • Nonrheumatic aortic (valve) stenosis       family history includes Heart attack in her father and mother; Heart disease in her maternal uncle and mother.     reports that she has never smoked. She has never used smokeless tobacco. She reports that she does not drink alcohol and does not use drugs.    No Known Allergies      Current Outpatient Medications:   •  amLODIPine (NORVASC) 5 MG tablet, Take 1 tablet by mouth Daily., Disp: 90 tablet, Rfl: 2  •  aspirin 81 MG EC tablet, Take 81 mg by mouth Daily., Disp: , Rfl:   •  Calcium Carbonate-Vitamin D (CALCIUM PLUS VITAMIN D PO), Take 1,200 mg by mouth Daily., Disp: , Rfl:   •  Cyanocobalamin (HM SUPER VITAMIN B12 PO), Take  by mouth Daily., Disp: , Rfl:   •  Multiple Vitamins-Minerals (Emergen-C Immune) pack, Take  by mouth As Needed., Disp: , Rfl:   •  pantoprazole (PROTONIX) 40 MG EC tablet, Take 40 mg by mouth daily., Disp: , Rfl:   •  rosuvastatin (CRESTOR) 20 MG tablet, Take 1 tablet by mouth Daily., Disp: 30 tablet, Rfl: 5  •  cetirizine (zyrTEC) 10 MG tablet, Take 10 mg by mouth Daily., Disp: , Rfl:       Physical Exam:  I have reviewed the patient's current vital signs as documented in the patient's EMR.   Vitals:    09/21/22 1455   BP: 142/65   Pulse: 61   Resp: 16     Body mass index is 28.98 kg/m².       09/21/22  1455   Weight: 69.6 kg (153 lb 6.4 oz)      Physical Exam  Constitutional:       General: She is not in acute distress.     Appearance: Normal appearance. She is well-developed and normal weight.   HENT:      Head: Normocephalic and atraumatic.   Eyes:      General: Lids are normal.      Conjunctiva/sclera: Conjunctivae normal.      Pupils: Pupils are equal, round, and reactive to light.   Neck:      Vascular: No carotid bruit or JVD.   Cardiovascular:      Rate and Rhythm: Normal  rate and regular rhythm.      Pulses: Normal pulses.           Radial pulses are 2+ on the right side and 2+ on the left side.        Dorsalis pedis pulses are 2+ on the right side and 2+ on the left side.        Posterior tibial pulses are 2+ on the right side and 2+ on the left side.      Heart sounds: Normal heart sounds, S1 normal and S2 normal. No murmur heard.  Pulmonary:      Effort: Pulmonary effort is normal. No respiratory distress.      Breath sounds: Normal breath sounds. No wheezing.   Abdominal:      General: Bowel sounds are normal. There is no distension.      Palpations: Abdomen is soft. There is no hepatomegaly or splenomegaly.      Tenderness: There is no abdominal tenderness.   Musculoskeletal:         General: No swelling. Normal range of motion.      Cervical back: Normal range of motion and neck supple.      Right lower leg: No edema.      Left lower leg: No edema.   Skin:     General: Skin is warm and dry.      Coloration: Skin is not jaundiced.      Findings: No rash.   Neurological:      General: No focal deficit present.      Mental Status: She is alert and oriented to person, place, and time. Mental status is at baseline.   Psychiatric:         Mood and Affect: Mood normal.         Speech: Speech normal.         Behavior: Behavior normal.         Thought Content: Thought content normal.         Judgment: Judgment normal.       DATA REVIEWED:     TTE/MARIAH:  Results for orders placed during the hospital encounter of 06/14/21    Adult Transthoracic Echo Complete w/ Color, Spectral and Contrast if necessary per protocol    Interpretation Summary  · Left ventricular diastolic function is consistent with age.  · Left ventricular wall thickness is consistent with mild concentric hypertrophy.  · Left ventricular ejection fraction appears to be greater than 70%. Left ventricular systolic function is normal.  · Mild aortic valve stenosis is present.  · Estimated right ventricular systolic pressure  from tricuspid regurgitation is moderately elevated (45-55 mmHg).        Laboratory evaluations:    Lab Results   Component Value Date    CREATININE 0.80 04/14/2021     No results found for: WBC, HGB, HCT, MCV, PLT  No results found for: CHOL, CHLPL, TRIG, HDL, LDL, LDLDIRECT  No results found for: TSH, J8SKPXY, T5XTASK, THYROIDAB  No results found for: HGBA1C  No results found for: ALT  No results found for: HGBA1C  Lab Results   Component Value Date    CREATININE 0.80 04/14/2021     No results found for: IRON, TIBC, FERRITIN  No results found for: INR, PROTIME        --------------------------------------------------------------------------------------------------------------------------    ASSESSMENT/PLAN:      Diagnosis Plan   1. Dyslipidemia     2. Essential hypertension  amLODIPine (NORVASC) 5 MG tablet       1. Dyslipidemia  • Recent lipid panel from PCP showed elevated LDL at 138.  She did not take her Crestor as prescribed at last visit.  Discussed with her about the importance of lowering her LDL to reduce her risk of cardiovascular disease.  She verbalized understanding and states that she will attempt to take the Crestor.  I advised her to call the office if she has any issues so we can possibly change her to a different medication if she cannot tolerate it.    2. Essential hypertension  • Patient has been noncompliant with taking her amlodipine.  Home blood pressure log showed systolic blood pressures running from 140s to 150s.  Discussed with her about the need for better blood pressure control.  She states that she forgets to take her amlodipine and only takes it when she remembers.  Long discussion was held regarding the need for compliance with taking her medications daily.  • Recent CMP showed normal kidney function and electrolytes.      This document has been @Electronically signed by MARCIAL Ness, 09/21/22, 2:15 PM EDT.       Dictated Utilizing Dragon Dictation: Part of this note may be  an electronic transcription/translation of spoken language to printed text using the Dragon Dictation System.    Follow-up appointment and medication changes provided in hand delivered After Visit Summary as well as reviewed in the room.

## 2023-02-27 ENCOUNTER — OFFICE VISIT (OUTPATIENT)
Dept: CARDIOLOGY | Facility: CLINIC | Age: 83
End: 2023-02-27
Payer: MEDICARE

## 2023-02-27 VITALS
WEIGHT: 135.2 LBS | HEIGHT: 61 IN | OXYGEN SATURATION: 97 % | HEART RATE: 60 BPM | SYSTOLIC BLOOD PRESSURE: 151 MMHG | DIASTOLIC BLOOD PRESSURE: 60 MMHG | BODY MASS INDEX: 25.53 KG/M2

## 2023-02-27 DIAGNOSIS — I10 PRIMARY HYPERTENSION: Primary | ICD-10-CM

## 2023-02-27 DIAGNOSIS — E78.2 MIXED HYPERLIPIDEMIA: ICD-10-CM

## 2023-02-27 PROCEDURE — 99213 OFFICE O/P EST LOW 20 MIN: CPT | Performed by: NURSE PRACTITIONER

## 2023-02-27 PROCEDURE — 93000 ELECTROCARDIOGRAM COMPLETE: CPT | Performed by: NURSE PRACTITIONER

## 2023-02-27 NOTE — PROGRESS NOTES
[facility]             [facility] MARCIAL Jovel Maria C., MD  Juan José Meza  1940 02/27/2023    Patient Active Problem List   Diagnosis   • Dyslipidemia   • Hypertension   • Chest pain   • Precordial chest pain   • Palpitations   • Essential hypertension   • Mixed hyperlipidemia   • COVID-19   • Microscopic hematuria   • Nonrheumatic aortic (valve) stenosis       Dear Bridget Segal MD:    Subjective     Chief Complaint   Patient presents with   • Follow-up     ROUTINE       HPI:     This is a 82 y.o. female with known past medical history of essential hypertension, nonobstructive coronary artery disease and dyslipidemia.    Juan José Meza presents today for routine cardiology follow up.  Patient states since her last visit she lost her son to cancer.  She has been very stressed and overwhelmed grieving the loss of her son.  She does state that she has been forgetful about taking her cholesterol medicine and her blood pressure medication therefore she only takes it as she remembers.  Denies any chest pain, shortness of breath or palpitations.  No recent lab work on file.  Blood pressure mildly elevated in the office today.    • Diagnostic Testing  1. Left heart catheterization 2017: Mild plaquing in the LAD otherwise unremarkable  2. Cardiac event monitor April 2021: Normal sinus rhythm with no arrhythmias noted  3. Echocardiogram 6/2021: EF greater than 70% with mild concentric hypertrophy  4. Nuclear stress test 6/2021: Mild fixed with mid anterior wall defect with normal wall motion with no significant reversibility consistent with breast attenuation artifact with no ischemia.     All other systems were reviewed and were negative.    Patient Active Problem List   Diagnosis   • Dyslipidemia   • Hypertension   • Chest pain   • Precordial chest pain   • Palpitations   • Essential hypertension   • Mixed hyperlipidemia   • COVID-19   • Microscopic  hematuria   • Nonrheumatic aortic (valve) stenosis       family history includes Heart attack in her father and mother; Heart disease in her maternal uncle and mother.     reports that she has never smoked. She has never used smokeless tobacco. She reports that she does not drink alcohol and does not use drugs.    No Known Allergies      Current Outpatient Medications:   •  amLODIPine (NORVASC) 5 MG tablet, Take 1 tablet by mouth Daily., Disp: 90 tablet, Rfl: 2  •  cetirizine (zyrTEC) 10 MG tablet, Take 10 mg by mouth Daily., Disp: , Rfl:   •  Cyanocobalamin (HM SUPER VITAMIN B12 PO), Take  by mouth Daily., Disp: , Rfl:   •  Multiple Vitamins-Minerals (Emergen-C Immune) pack, Take  by mouth As Needed., Disp: , Rfl:   •  pantoprazole (PROTONIX) 40 MG EC tablet, Take 40 mg by mouth daily., Disp: , Rfl:   •  rosuvastatin (CRESTOR) 20 MG tablet, Take 1 tablet by mouth Daily., Disp: 30 tablet, Rfl: 5  •  aspirin 81 MG EC tablet, Take 81 mg by mouth Daily., Disp: , Rfl:   •  Calcium Carbonate-Vitamin D (CALCIUM PLUS VITAMIN D PO), Take 1,200 mg by mouth Daily., Disp: , Rfl:       Physical Exam:  I have reviewed the patient's current vital signs as documented in the patient's EMR.   Vitals:    02/27/23 1042   BP: 151/60   Pulse: 60   SpO2: 97%     Body mass index is 25.55 kg/m².       02/27/23  1042   Weight: 61.3 kg (135 lb 3.2 oz)      Physical Exam  Constitutional:       General: She is not in acute distress.     Appearance: Normal appearance. She is well-developed and normal weight.   HENT:      Head: Normocephalic and atraumatic.   Eyes:      General: Lids are normal.      Conjunctiva/sclera: Conjunctivae normal.      Pupils: Pupils are equal, round, and reactive to light.   Neck:      Vascular: No carotid bruit or JVD.   Cardiovascular:      Rate and Rhythm: Normal rate and regular rhythm.      Pulses: Normal pulses.      Heart sounds: Normal heart sounds, S1 normal and S2 normal. No murmur heard.  Pulmonary:       Effort: Pulmonary effort is normal. No respiratory distress.      Breath sounds: Normal breath sounds. No wheezing.   Abdominal:      General: Bowel sounds are normal. There is no distension.      Palpations: Abdomen is soft. There is no hepatomegaly or splenomegaly.      Tenderness: There is no abdominal tenderness.   Musculoskeletal:         General: No swelling. Normal range of motion.      Cervical back: Normal range of motion and neck supple.      Right lower leg: No edema.      Left lower leg: No edema.   Skin:     General: Skin is warm and dry.      Coloration: Skin is not jaundiced.      Findings: No rash.   Neurological:      General: No focal deficit present.      Mental Status: She is alert and oriented to person, place, and time. Mental status is at baseline.   Psychiatric:         Mood and Affect: Mood normal.         Speech: Speech normal.         Behavior: Behavior normal.         Thought Content: Thought content normal.         Judgment: Judgment normal.       DATA REVIEWED:     TTE/MARIAH:  Results for orders placed during the hospital encounter of 06/14/21    Adult Transthoracic Echo Complete w/ Color, Spectral and Contrast if necessary per protocol    Interpretation Summary  · Left ventricular diastolic function is consistent with age.  · Left ventricular wall thickness is consistent with mild concentric hypertrophy.  · Left ventricular ejection fraction appears to be greater than 70%. Left ventricular systolic function is normal.  · Mild aortic valve stenosis is present.  · Estimated right ventricular systolic pressure from tricuspid regurgitation is moderately elevated (45-55 mmHg).      Laboratory evaluations:    Lab Results   Component Value Date    CREATININE 0.80 04/14/2021     No results found for: WBC, HGB, HCT, MCV, PLT  No results found for: CHOL, CHLPL, TRIG, HDL, LDL, LDLDIRECT  No results found for: TSH, V7GXJNB, B0YEQJD, THYROIDAB  No results found for: HGBA1C  No results found for:  ALT  No results found for: HGBA1C  Lab Results   Component Value Date    CREATININE 0.80 04/14/2021     No results found for: IRON, TIBC, FERRITIN  No results found for: INR, PROTIME          ECG 12 Lead    Date/Time: 2/27/2023 11:18 AM  Performed by: Brianda Jovel APRN  Authorized by: Brianda Jovel APRN   Comparison: compared with previous ECG   Similar to previous ECG  Rhythm: sinus rhythm  T inversion: V1    Clinical impression: non-specific ECG          --------------------------------------------------------------------------------------------------------------------------    ASSESSMENT/PLAN:      Diagnosis Plan   1. Primary hypertension        2. Mixed hyperlipidemia            1. Hypertension  • Blood pressure mildly elevated in the office today.  Patient recently experienced the loss of her son and states she is very stressed and overwhelmed at this time.  She also states that she is forgetful about taking her medications therefore I instructed her to obtain a pillbox marked with day so that she can keep track of what she has taken.  She states that she will do this.  Discussed with her about the importance of taking her medications appropriately.  Continue amlodipine.    2. Hyperlipidemia  • Patient has been forgetful with taking her medications.  Discussed with her about the importance of keeping her cholesterol controlled given her history of nonobstructive coronary artery disease.  She states she will be better at taking her medication.      This document has been @Electronically signed by MARCIAL Ness, 02/27/23, 10:30 AM EST.       Dictated Utilizing Dragon Dictation: Part of this note may be an electronic transcription/translation of spoken language to printed text using the Dragon Dictation System.    Follow-up appointment and medication changes provided in hand delivered After Visit Summary as well as reviewed in the room.

## 2023-05-16 ENCOUNTER — TRANSCRIBE ORDERS (OUTPATIENT)
Dept: ADMINISTRATIVE | Facility: HOSPITAL | Age: 83
End: 2023-05-16
Payer: MEDICARE

## 2023-05-16 DIAGNOSIS — Z12.31 VISIT FOR SCREENING MAMMOGRAM: Primary | ICD-10-CM

## 2023-06-30 PROBLEM — R00.1 BRADYCARDIA, SINUS: Status: ACTIVE | Noted: 2023-06-30

## 2023-07-20 ENCOUNTER — TELEPHONE (OUTPATIENT)
Dept: CARDIOLOGY | Facility: CLINIC | Age: 83
End: 2023-07-20

## 2023-07-20 NOTE — TELEPHONE ENCOUNTER
Caller: Juan José Meza    Relationship to patient: Self    Best call back number: [phone]    Chief complaint: LOW PULSE AND LOW HEART RATE. SHE WANTS TO DISCUSS DOING A HEART MONITOR.     Type of visit: FU    Requested date: ASAP     If rescheduling, when is the original appointment: 08.25.23     Additional notes:[facility] HAS SOMETHING ON 08.03.23 IF RESCHEDULING IS OK

## 2023-08-25 ENCOUNTER — OFFICE VISIT (OUTPATIENT)
Dept: CARDIOLOGY | Facility: CLINIC | Age: 83
End: 2023-08-25
Payer: MEDICARE

## 2023-08-25 VITALS
HEART RATE: 61 BPM | RESPIRATION RATE: 18 BRPM | BODY MASS INDEX: 26.9 KG/M2 | OXYGEN SATURATION: 96 % | HEIGHT: 60 IN | DIASTOLIC BLOOD PRESSURE: 76 MMHG | SYSTOLIC BLOOD PRESSURE: 159 MMHG | WEIGHT: 137 LBS

## 2023-08-25 DIAGNOSIS — E78.2 MIXED HYPERLIPIDEMIA: ICD-10-CM

## 2023-08-25 DIAGNOSIS — I10 ESSENTIAL HYPERTENSION: ICD-10-CM

## 2023-08-25 DIAGNOSIS — R00.1 BRADYCARDIA, SINUS: Primary | ICD-10-CM

## 2023-08-25 PROCEDURE — 99214 OFFICE O/P EST MOD 30 MIN: CPT | Performed by: NURSE PRACTITIONER

## 2023-08-25 PROCEDURE — 3077F SYST BP >= 140 MM HG: CPT | Performed by: NURSE PRACTITIONER

## 2023-08-25 PROCEDURE — 1160F RVW MEDS BY RX/DR IN RCRD: CPT | Performed by: NURSE PRACTITIONER

## 2023-08-25 PROCEDURE — 1159F MED LIST DOCD IN RCRD: CPT | Performed by: NURSE PRACTITIONER

## 2023-08-25 PROCEDURE — 3078F DIAST BP <80 MM HG: CPT | Performed by: NURSE PRACTITIONER

## 2023-08-25 RX ORDER — VIBEGRON 75 MG/1
TABLET, FILM COATED ORAL
COMMUNITY

## 2023-08-25 NOTE — PROGRESS NOTES
[facility]             [facility] MARCIAL Jovel Maria C., MD  Juan José Meza  1940 08/25/2023    Patient Active Problem List   Diagnosis    Dyslipidemia    Hypertension    Chest pain    Precordial chest pain    Palpitations    Essential hypertension    Mixed hyperlipidemia    COVID-19    Microscopic hematuria    Nonrheumatic aortic (valve) stenosis    Bradycardia, sinus       Dear Bridget Segal MD:    Subjective     Chief Complaint   Patient presents with    Follow-up     Routine       HPI:     This is a 82 y.o. female with known past medical history of essential hypertension, nonobstructive coronary artery disease and dyslipidemia.      Juan José Meza presents today for routine cardiology follow up.  Patient states she has been doing overall well since her last visit.  States she has been taking her blood pressure medicine regularly.  Trialed Crestor 5 mg however developed myalgias and was unable to tolerate this so she stopped.  Brings in lab work with recent LDL of 138.  Blood pressure still mildly elevated.  Does report heart rates have been in the upper 50s and 60s although she is asymptomatic and denies any dizziness, palpitations or syncope.    Diagnostic Testing  Left heart catheterization 2017: Mild plaquing in the LAD otherwise unremarkable  Cardiac event monitor April 2021: Normal sinus rhythm with no arrhythmias noted  Echocardiogram 6/2021: EF greater than 70% with mild concentric hypertrophy  Nuclear stress test 6/2021: Mild fixed with mid anterior wall defect with normal wall motion with no significant reversibility consistent with breast attenuation artifact with no ischemia.     All other systems were reviewed and were negative.    Patient Active Problem List   Diagnosis    Dyslipidemia    Hypertension    Chest pain    Precordial chest pain    Palpitations    Essential hypertension    Mixed hyperlipidemia    COVID-19    Microscopic  hematuria    Nonrheumatic aortic (valve) stenosis    Bradycardia, sinus       family history includes Heart attack in her father and mother; Heart disease in her maternal uncle and mother.     reports that she has never smoked. She has never used smokeless tobacco. She reports that she does not drink alcohol and does not use drugs.    No Known Allergies      Current Outpatient Medications:     acetaminophen (TYLENOL) 500 MG tablet, Take 1 tablet by mouth Every 6 (Six) Hours As Needed for Mild Pain., Disp: , Rfl:     amLODIPine (NORVASC) 5 MG tablet, Take 1 tablet by mouth Daily., Disp: 90 tablet, Rfl: 2    aspirin 81 MG EC tablet, Take 1 tablet by mouth Daily., Disp: , Rfl:     Calcium Carbonate-Vitamin D (CALCIUM PLUS VITAMIN D PO), Take 1,200 mg by mouth Daily., Disp: , Rfl:     Cyanocobalamin (HM SUPER VITAMIN B12 PO), Take  by mouth Daily., Disp: , Rfl:     Multiple Vitamins-Minerals (Emergen-C Immune) pack, Take  by mouth As Needed., Disp: , Rfl:     pantoprazole (PROTONIX) 40 MG EC tablet, Take 1 tablet by mouth Daily., Disp: , Rfl:     rosuvastatin (CRESTOR) 20 MG tablet, Take 1 tablet by mouth Daily., Disp: 30 tablet, Rfl: 5    Vibegron (Gemtesa) 75 MG tablet, Take  by mouth., Disp: , Rfl:     cetirizine (zyrTEC) 10 MG tablet, Take 10 mg by mouth Daily. (Patient not taking: Reported on 6/30/2023), Disp: , Rfl:       Physical Exam:  I have reviewed the patient's current vital signs as documented in the patient's EMR.   Vitals:    08/25/23 1052   BP: 159/76   Pulse: 61   Resp: 18   SpO2: 96%     Body mass index is 26.76 kg/mý.       08/25/23  1052   Weight: 62.1 kg (137 lb)      Physical Exam  Constitutional:       General: She is not in acute distress.     Appearance: Normal appearance. She is well-developed and normal weight.   HENT:      Head: Normocephalic and atraumatic.   Eyes:      General: Lids are normal.      Conjunctiva/sclera: Conjunctivae normal.      Pupils: Pupils are equal, round, and reactive  to light.   Neck:      Vascular: No carotid bruit or JVD.   Cardiovascular:      Rate and Rhythm: Normal rate and regular rhythm.      Pulses: Normal pulses.      Heart sounds: Normal heart sounds, S1 normal and S2 normal. No murmur heard.  Pulmonary:      Effort: Pulmonary effort is normal. No respiratory distress.      Breath sounds: Normal breath sounds. No wheezing.   Abdominal:      General: Bowel sounds are normal. There is no distension.      Palpations: Abdomen is soft. There is no hepatomegaly or splenomegaly.      Tenderness: There is no abdominal tenderness.   Musculoskeletal:         General: No swelling. Normal range of motion.      Cervical back: Normal range of motion and neck supple.      Right lower leg: No edema.      Left lower leg: No edema.   Skin:     General: Skin is warm and dry.      Coloration: Skin is not jaundiced.      Findings: No rash.   Neurological:      General: No focal deficit present.      Mental Status: She is alert and oriented to person, place, and time. Mental status is at baseline.   Psychiatric:         Mood and Affect: Mood normal.         Speech: Speech normal.         Behavior: Behavior normal.         Thought Content: Thought content normal.         Judgment: Judgment normal.          DATA REVIEWED:     TTE/MARIAH:  Results for orders placed during the hospital encounter of 06/14/21    Adult Transthoracic Echo Complete w/ Color, Spectral and Contrast if necessary per protocol    Interpretation Summary  ú Left ventricular diastolic function is consistent with age.  ú Left ventricular wall thickness is consistent with mild concentric hypertrophy.  ú Left ventricular ejection fraction appears to be greater than 70%. Left ventricular systolic function is normal.  ú Mild aortic valve stenosis is present.  ú Estimated right ventricular systolic pressure from tricuspid regurgitation is moderately elevated (45-55 mmHg).      Laboratory evaluations:    Lab Results   Component Value  Date    CREATININE 0.80 04/14/2021     No results found for: WBC, HGB, HCT, MCV, PLT  No results found for: CHOL, CHLPL, TRIG, HDL, LDL, LDLDIRECT  No results found for: TSH, R5DHVNB, Q3ESFMW, THYROIDAB  No results found for: HGBA1C  No results found for: ALT  No results found for: HGBA1C  Lab Results   Component Value Date    CREATININE 0.80 04/14/2021     No results found for: IRON, TIBC, FERRITIN  No results found for: INR, PROTIME        --------------------------------------------------------------------------------------------------------------------------    ASSESSMENT/PLAN:      Diagnosis Plan   1. Bradycardia, sinus  TSH      2. Essential hypertension        3. Mixed hyperlipidemia            Essential hypertension  Blood pressure still not at goal.  Increase amlodipine to 10 mg daily for better blood pressure control.  Recent lab work shows stable kidney function.    Hyperlipidemia  Patient was unable to tolerate Crestor due to it giving her myalgias.  She states she would like to try to take it every other day as taking it daily caused her too much pain.  Recently panel showed LDL in the 130s which is mildly improved from her last check.  For now she can continue with Crestor every other day as tolerated and if unable to tolerate we will consider PCSK9.    3.  Sinus bradycardia  Patient brings in log of heart rates ranging from upper 50s to 60s.  Denies any symptoms such as dizziness or syncope.  Not on any rate controlling medications.  Check TSH.  Continue to monitor for now. I did advise her to reach out to us if heart rate drops in the 30s and 40s and she becomes symptomatic and we can do event monitor.      This document has been @Electronically signed by MARCIAL Ness, 08/25/23, 10:09 AM EDT.       Dictated Utilizing Dragon Dictation: Part of this note may be an electronic transcription/translation of spoken language to printed text using the Dragon Dictation System.    Follow-up appointment  and medication changes provided in hand delivered After Visit Summary as well as reviewed in the room.

## 2023-10-07 DIAGNOSIS — I10 ESSENTIAL HYPERTENSION: ICD-10-CM

## 2023-10-09 RX ORDER — AMLODIPINE BESYLATE 5 MG/1
5 TABLET ORAL DAILY
Qty: 90 TABLET | Refills: 2 | Status: SHIPPED | OUTPATIENT
Start: 2023-10-09

## 2024-01-03 ENCOUNTER — OFFICE VISIT (OUTPATIENT)
Dept: CARDIOLOGY | Facility: CLINIC | Age: 84
End: 2024-01-03
Payer: MEDICARE

## 2024-01-03 VITALS
SYSTOLIC BLOOD PRESSURE: 134 MMHG | DIASTOLIC BLOOD PRESSURE: 75 MMHG | OXYGEN SATURATION: 98 % | WEIGHT: 140 LBS | RESPIRATION RATE: 18 BRPM | BODY MASS INDEX: 27.48 KG/M2 | HEART RATE: 63 BPM | HEIGHT: 60 IN

## 2024-01-03 DIAGNOSIS — I10 ESSENTIAL HYPERTENSION: ICD-10-CM

## 2024-01-03 DIAGNOSIS — E78.5 DYSLIPIDEMIA: Primary | ICD-10-CM

## 2024-01-03 DIAGNOSIS — I25.10 ASCVD (ARTERIOSCLEROTIC CARDIOVASCULAR DISEASE): ICD-10-CM

## 2024-01-03 PROCEDURE — 1160F RVW MEDS BY RX/DR IN RCRD: CPT | Performed by: NURSE PRACTITIONER

## 2024-01-03 PROCEDURE — 3075F SYST BP GE 130 - 139MM HG: CPT | Performed by: NURSE PRACTITIONER

## 2024-01-03 PROCEDURE — 1159F MED LIST DOCD IN RCRD: CPT | Performed by: NURSE PRACTITIONER

## 2024-01-03 PROCEDURE — 3078F DIAST BP <80 MM HG: CPT | Performed by: NURSE PRACTITIONER

## 2024-01-03 RX ORDER — BACILLUS COAGULANS/INULIN 1B-250 MG
CAPSULE ORAL
COMMUNITY

## 2024-01-03 RX ORDER — AMLODIPINE BESYLATE 10 MG/1
10 TABLET ORAL DAILY
Qty: 60 TABLET | Refills: 2 | Status: SHIPPED | OUTPATIENT
Start: 2024-01-03

## 2024-01-03 NOTE — PROGRESS NOTES
[facility]             [facility] MARCIAL Jovel Maria C., MD  Juan José Meza  1940 01/03/2024    Patient Active Problem List   Diagnosis    Dyslipidemia    Hypertension    Chest pain    Precordial chest pain    Palpitations    Essential hypertension    Mixed hyperlipidemia    COVID-19    Microscopic hematuria    Nonrheumatic aortic (valve) stenosis    Bradycardia, sinus    ASCVD (arteriosclerotic cardiovascular disease)       Dear rBidget Segal MD:    Subjective     Chief Complaint   Patient presents with    Follow-up     ROUTINE       HPI:     This is a 83 y.o. female with known past medical history of essential hypertension, nonobstructive coronary artery disease and dyslipidemia.       Juan José Meza presents today for routine cardiology follow up.  Patient states she has been doing overall well since her last visit.  Denies any chest pain, shortness of breath or palpitations.  Blood pressure well-controlled.  Lab work from July 2023 showed stable kidney function and electrolytes.  LDL in the 130s.  Patient unable to tolerate statins secondary to myalgias.    Diagnostic Testing  Left heart catheterization 2017: Mild plaquing in the LAD otherwise unremarkable  Cardiac event monitor April 2021: Normal sinus rhythm with no arrhythmias noted  Echocardiogram 6/2021: EF greater than 70% with mild concentric hypertrophy  Nuclear stress test 6/2021: Mild fixed with mid anterior wall defect with normal wall motion with no significant reversibility consistent with breast attenuation artifact with no ischemia.     All other systems were reviewed and were negative.    Patient Active Problem List   Diagnosis    Dyslipidemia    Hypertension    Chest pain    Precordial chest pain    Palpitations    Essential hypertension    Mixed hyperlipidemia    COVID-19    Microscopic hematuria    Nonrheumatic aortic (valve) stenosis    Bradycardia, sinus    ASCVD  (arteriosclerotic cardiovascular disease)       family history includes Heart attack in her father and mother; Heart disease in her maternal uncle and mother.     reports that she has never smoked. She has never used smokeless tobacco. She reports that she does not drink alcohol and does not use drugs.    No Known Allergies      Current Outpatient Medications:     acetaminophen (TYLENOL) 500 MG tablet, Take 1 tablet by mouth Every 6 (Six) Hours As Needed for Mild Pain., Disp: , Rfl:     amLODIPine (NORVASC) 10 MG tablet, Take 1 tablet by mouth Daily., Disp: 60 tablet, Rfl: 2    aspirin 81 MG EC tablet, Take 1 tablet by mouth Daily., Disp: , Rfl:     Bacillus Coagulans-Inulin (Probiotic) 1-250 BILLION-MG capsule, Take  by mouth., Disp: , Rfl:     Calcium Carbonate-Vitamin D (CALCIUM PLUS VITAMIN D PO), Take 1,200 mg by mouth Daily., Disp: , Rfl:     Cyanocobalamin (HM SUPER VITAMIN B12 PO), Take  by mouth Daily., Disp: , Rfl:     Multiple Vitamins-Minerals (Emergen-C Immune) pack, Take  by mouth As Needed., Disp: , Rfl:     NON FORMULARY, Take 6,900 mg by mouth 3 times a day. PNEUMOTROPHIN pmg, Disp: , Rfl:     cetirizine (zyrTEC) 10 MG tablet, Take 10 mg by mouth Daily. (Patient not taking: Reported on 6/30/2023), Disp: , Rfl:     pantoprazole (PROTONIX) 40 MG EC tablet, Take 1 tablet by mouth Daily. (Patient not taking: Reported on 1/3/2024), Disp: , Rfl:     rosuvastatin (CRESTOR) 20 MG tablet, Take 1 tablet by mouth Daily. (Patient not taking: Reported on 1/3/2024), Disp: 30 tablet, Rfl: 5    Vibegron (Gemtesa) 75 MG tablet, Take  by mouth. (Patient not taking: Reported on 1/3/2024), Disp: , Rfl:       Physical Exam:  I have reviewed the patient's current vital signs as documented in the patient's EMR.   Vitals:    01/03/24 1105   BP: 134/75   Pulse: 63   Resp: 18   SpO2: 98%     Body mass index is 27.34 kg/m².       01/03/24  1105   Weight: 63.5 kg (140 lb)      Physical Exam  Constitutional:       General: She  is not in acute distress.     Appearance: Normal appearance. She is well-developed and normal weight.   HENT:      Head: Normocephalic and atraumatic.   Eyes:      General: Lids are normal.      Conjunctiva/sclera: Conjunctivae normal.      Pupils: Pupils are equal, round, and reactive to light.   Neck:      Vascular: No carotid bruit or JVD.   Cardiovascular:      Rate and Rhythm: Normal rate and regular rhythm.      Pulses: Normal pulses.      Heart sounds: Normal heart sounds, S1 normal and S2 normal. No murmur heard.  Pulmonary:      Effort: Pulmonary effort is normal. No respiratory distress.      Breath sounds: Normal breath sounds. No wheezing.   Abdominal:      General: Bowel sounds are normal. There is no distension.      Palpations: Abdomen is soft. There is no hepatomegaly or splenomegaly.      Tenderness: There is no abdominal tenderness.   Musculoskeletal:         General: No swelling. Normal range of motion.      Cervical back: Normal range of motion and neck supple.      Right lower leg: No edema.      Left lower leg: No edema.   Skin:     General: Skin is warm and dry.      Coloration: Skin is not jaundiced.      Findings: No rash.   Neurological:      General: No focal deficit present.      Mental Status: She is alert and oriented to person, place, and time. Mental status is at baseline.   Psychiatric:         Mood and Affect: Mood normal.         Speech: Speech normal.         Behavior: Behavior normal.         Thought Content: Thought content normal.         Judgment: Judgment normal.            DATA REVIEWED:     TTE/MARIAH:  Results for orders placed during the hospital encounter of 06/14/21    Adult Transthoracic Echo Complete w/ Color, Spectral and Contrast if necessary per protocol    Interpretation Summary  · Left ventricular diastolic function is consistent with age.  · Left ventricular wall thickness is consistent with mild concentric hypertrophy.  · Left ventricular ejection fraction appears  "to be greater than 70%. Left ventricular systolic function is normal.  · Mild aortic valve stenosis is present.  · Estimated right ventricular systolic pressure from tricuspid regurgitation is moderately elevated (45-55 mmHg).      Laboratory evaluations:    Lab Results   Component Value Date    CREATININE 0.80 04/14/2021     No results found for: \"WBC\", \"HGB\", \"HCT\", \"MCV\", \"PLT\"  No results found for: \"CHOL\", \"CHLPL\", \"TRIG\", \"HDL\", \"LDL\", \"LDLDIRECT\"  No results found for: \"TSH\", \"B4PKVPT\", \"K8NFZMG\", \"THYROIDAB\"  No results found for: \"HGBA1C\"  No results found for: \"ALT\"  No results found for: \"HGBA1C\"  Lab Results   Component Value Date    CREATININE 0.80 04/14/2021     No results found for: \"IRON\", \"TIBC\", \"FERRITIN\"  No results found for: \"INR\", \"PROTIME\"          ECG 12 Lead    Date/Time: 1/3/2024 11:47 AM  Performed by: Brianda Jovel APRN    Authorized by: Brianda Jovel APRN  Comparison: compared with previous ECG   Rhythm: sinus rhythm  Other findings: non-specific ST-T wave changes    Clinical impression: non-specific ECG         --------------------------------------------------------------------------------------------------------------------------    ASSESSMENT/PLAN:      Diagnosis Plan   1. Dyslipidemia        2. Essential hypertension  amLODIPine (NORVASC) 10 MG tablet      3. ASCVD (arteriosclerotic cardiovascular disease)            Dyslipidemia  ASCVD  Lab work from July 2023 showed LDL in the 130s.  Patient unable to tolerate statins due to myalgias.  I did discuss with her about initiation of PCSK9 however states she is not interested in this at this time and will work on her diet.    Hypertension  Blood pressure well-controlled.  Continue with amlodipine 10 mg daily.  Recent lab work shows stable kidney function.      This document has been @Electronically signed by MARCIAL Ness, 01/03/24, 9:25 AM EST.       Dictated Utilizing Dragon Dictation: Part of this note " may be an electronic transcription/translation of spoken language to printed text using the Dragon Dictation System.    Follow-up appointment and medication changes provided in hand delivered After Visit Summary as well as reviewed in the room.

## 2024-07-03 ENCOUNTER — OFFICE VISIT (OUTPATIENT)
Dept: CARDIOLOGY | Facility: CLINIC | Age: 84
End: 2024-07-03
Payer: MEDICARE

## 2024-07-03 ENCOUNTER — TELEPHONE (OUTPATIENT)
Dept: CARDIOLOGY | Facility: CLINIC | Age: 84
End: 2024-07-03
Payer: MEDICARE

## 2024-07-03 VITALS
HEIGHT: 60 IN | WEIGHT: 139.6 LBS | OXYGEN SATURATION: 96 % | HEART RATE: 81 BPM | SYSTOLIC BLOOD PRESSURE: 126 MMHG | BODY MASS INDEX: 27.41 KG/M2 | DIASTOLIC BLOOD PRESSURE: 67 MMHG

## 2024-07-03 DIAGNOSIS — E78.5 DYSLIPIDEMIA: ICD-10-CM

## 2024-07-03 DIAGNOSIS — I25.10 ASCVD (ARTERIOSCLEROTIC CARDIOVASCULAR DISEASE): Primary | ICD-10-CM

## 2024-07-03 DIAGNOSIS — I48.91 NEW ONSET ATRIAL FIBRILLATION: ICD-10-CM

## 2024-07-03 DIAGNOSIS — I10 ESSENTIAL HYPERTENSION: ICD-10-CM

## 2024-07-03 PROCEDURE — 1159F MED LIST DOCD IN RCRD: CPT | Performed by: NURSE PRACTITIONER

## 2024-07-03 PROCEDURE — 99214 OFFICE O/P EST MOD 30 MIN: CPT | Performed by: NURSE PRACTITIONER

## 2024-07-03 PROCEDURE — 3074F SYST BP LT 130 MM HG: CPT | Performed by: NURSE PRACTITIONER

## 2024-07-03 PROCEDURE — 3078F DIAST BP <80 MM HG: CPT | Performed by: NURSE PRACTITIONER

## 2024-07-03 PROCEDURE — 93000 ELECTROCARDIOGRAM COMPLETE: CPT | Performed by: NURSE PRACTITIONER

## 2024-07-03 PROCEDURE — 1160F RVW MEDS BY RX/DR IN RCRD: CPT | Performed by: NURSE PRACTITIONER

## 2024-07-03 RX ORDER — LYSINE HCL 500 MG
500 TABLET ORAL DAILY
COMMUNITY

## 2024-07-03 NOTE — TELEPHONE ENCOUNTER
Sent fax request for labs.         ----- Message from Brianda Jovel sent at 7/3/2024 11:11 AM EDT -----  Please request labs from PCP

## 2024-07-03 NOTE — PROGRESS NOTES
[facility]             [facility] MRACIAL Jovel Maria C., MD  Juan José Meza  1940 07/03/2024    Patient Active Problem List   Diagnosis    Dyslipidemia    Hypertension    Chest pain    Precordial chest pain    Palpitations    Essential hypertension    Mixed hyperlipidemia    COVID-19    Microscopic hematuria    Nonrheumatic aortic (valve) stenosis    Bradycardia, sinus    ASCVD (arteriosclerotic cardiovascular disease)    New onset atrial fibrillation       Dear Bridget Segal MD:    Subjective     Chief Complaint   Patient presents with    Follow-up       HPI:     This is a 83 y.o. female with known past medical history of essential hypertension, nonobstructive coronary artery disease and dyslipidemia.       Juan José Meza presents today for routine cardiology follow up.  Patient states she has been doing overall well since her last visit.  Denies any chest pain or shortness of breath.  Reports some intermittent palpitations.  Blood pressure well-controlled.  Does not take cholesterol pill.  Only takes amlodipine occasionally.  EKG obtained today which shows atrial fibrillation with controlled rate which is a new diagnosis for her    Diagnostic Testing  Left heart catheterization 2017: Mild plaquing in the LAD otherwise unremarkable  Cardiac event monitor April 2021: Normal sinus rhythm with no arrhythmias noted  Echocardiogram 6/2021: EF greater than 70% with mild concentric hypertrophy  Nuclear stress test 6/2021: Mild fixed with mid anterior wall defect with normal wall motion with no significant reversibility consistent with breast attenuation artifact with no ischemia       All other systems were reviewed and were negative.    Patient Active Problem List   Diagnosis    Dyslipidemia    Hypertension    Chest pain    Precordial chest pain    Palpitations    Essential hypertension    Mixed hyperlipidemia    COVID-19    Microscopic hematuria     Nonrheumatic aortic (valve) stenosis    Bradycardia, sinus    ASCVD (arteriosclerotic cardiovascular disease)    New onset atrial fibrillation       family history includes Heart attack in her father and mother; Heart disease in her maternal uncle and mother.     reports that she has never smoked. She has never used smokeless tobacco. She reports that she does not drink alcohol and does not use drugs.    No Known Allergies      Current Outpatient Medications:     acetaminophen (TYLENOL) 500 MG tablet, Take 1 tablet by mouth Every 6 (Six) Hours As Needed for Mild Pain., Disp: , Rfl:     amLODIPine (NORVASC) 10 MG tablet, Take 1 tablet by mouth Daily., Disp: 60 tablet, Rfl: 2    Calcium Carbonate-Vitamin D (CALCIUM PLUS VITAMIN D PO), Take 1,200 mg by mouth Daily., Disp: , Rfl:     cetirizine (zyrTEC) 10 MG tablet, Take 1 tablet by mouth Daily., Disp: , Rfl:     Cyanocobalamin (HM SUPER VITAMIN B12 PO), Take  by mouth Daily., Disp: , Rfl:     Lysine HCl (l-lysine) 500 MG tablet tablet, Take 1 tablet by mouth Daily., Disp: , Rfl:     Multiple Vitamins-Minerals (Emergen-C Immune) pack, Take  by mouth As Needed., Disp: , Rfl:     apixaban (ELIQUIS) 5 MG tablet tablet, Take 1 tablet by mouth 2 (Two) Times a Day., Disp: 60 tablet, Rfl: 3    Bacillus Coagulans-Inulin (Probiotic) 1-250 BILLION-MG capsule, Take  by mouth. (Patient not taking: Reported on 7/3/2024), Disp: , Rfl:     NON FORMULARY, Take 6,900 mg by mouth 3 times a day. PNEUMOTROPHIN pmg (Patient not taking: Reported on 7/3/2024), Disp: , Rfl:     pantoprazole (PROTONIX) 40 MG EC tablet, Take 1 tablet by mouth Daily. (Patient not taking: Reported on 1/3/2024), Disp: , Rfl:     rosuvastatin (CRESTOR) 20 MG tablet, Take 1 tablet by mouth Daily. (Patient not taking: Reported on 1/3/2024), Disp: 30 tablet, Rfl: 5    Vibegron (Gemtesa) 75 MG tablet, Take  by mouth. (Patient not taking: Reported on 1/3/2024), Disp: , Rfl:       Physical Exam:  I have reviewed the  patient's current vital signs as documented in the patient's EMR.   Vitals:    07/03/24 1055   BP: 126/67   Pulse: 81   SpO2: 96%     Body mass index is 27.26 kg/m².       07/03/24  1055   Weight: 63.3 kg (139 lb 9.6 oz)      Physical Exam  Constitutional:       General: She is not in acute distress.     Appearance: Normal appearance. She is well-developed and normal weight.   HENT:      Head: Normocephalic and atraumatic.   Eyes:      General: Lids are normal.      Conjunctiva/sclera: Conjunctivae normal.      Pupils: Pupils are equal, round, and reactive to light.   Neck:      Vascular: No carotid bruit or JVD.   Cardiovascular:      Rate and Rhythm: Normal rate. Rhythm irregular.      Pulses: Normal pulses.      Heart sounds: Normal heart sounds, S1 normal and S2 normal. No murmur heard.  Pulmonary:      Effort: Pulmonary effort is normal. No respiratory distress.      Breath sounds: Normal breath sounds. No wheezing.   Abdominal:      General: Bowel sounds are normal. There is no distension.      Palpations: Abdomen is soft. There is no hepatomegaly or splenomegaly.      Tenderness: There is no abdominal tenderness.   Musculoskeletal:         General: No swelling. Normal range of motion.      Cervical back: Normal range of motion and neck supple.      Right lower leg: No edema.      Left lower leg: No edema.   Skin:     General: Skin is warm and dry.      Coloration: Skin is not jaundiced.      Findings: No rash.   Neurological:      General: No focal deficit present.      Mental Status: She is alert and oriented to person, place, and time. Mental status is at baseline.   Psychiatric:         Mood and Affect: Mood normal.         Speech: Speech normal.         Behavior: Behavior normal.         Thought Content: Thought content normal.         Judgment: Judgment normal.          DATA REVIEWED:     TTE/MARIAH:  Results for orders placed during the hospital encounter of 06/14/21    Adult Transthoracic Echo Complete w/  "Color, Spectral and Contrast if necessary per protocol    Interpretation Summary  · Left ventricular diastolic function is consistent with age.  · Left ventricular wall thickness is consistent with mild concentric hypertrophy.  · Left ventricular ejection fraction appears to be greater than 70%. Left ventricular systolic function is normal.  · Mild aortic valve stenosis is present.  · Estimated right ventricular systolic pressure from tricuspid regurgitation is moderately elevated (45-55 mmHg).      Laboratory evaluations:    Lab Results   Component Value Date    CREATININE 0.80 04/14/2021     No results found for: \"WBC\", \"HGB\", \"HCT\", \"MCV\", \"PLT\"  No results found for: \"CHOL\", \"CHLPL\", \"TRIG\", \"HDL\", \"LDL\", \"LDLDIRECT\"  No results found for: \"TSH\", \"T1LESJN\", \"S5QJHUQ\", \"THYROIDAB\"  No results found for: \"HGBA1C\"  No results found for: \"ALT\"  No results found for: \"HGBA1C\"  Lab Results   Component Value Date    CREATININE 0.80 04/14/2021     No results found for: \"IRON\", \"TIBC\", \"FERRITIN\"  No results found for: \"INR\", \"PROTIME\"        ECG 12 Lead    Date/Time: 7/3/2024 11:45 AM  Performed by: Brianda Jovel APRN    Authorized by: Brianda Jovel APRN  Comparison: compared with previous ECG   Rhythm: atrial fibrillation  Rate: normal  Other findings: non-specific ST-T wave changes    Clinical impression: non-specific ECG         --------------------------------------------------------------------------------------------------------------------------    ASSESSMENT/PLAN:      Diagnosis Plan   1. ASCVD (arteriosclerotic cardiovascular disease)        2. Essential hypertension        3. Dyslipidemia        4. New onset atrial fibrillation            ASCVD  Dyslipidemia  Lab work from July 2023 showed LDL in the 130s.  Patient unable to tolerate statins due to myalgias.  Did discuss about initiation of PCSK9 however states she is not interested in this.  Denies any chest " pain    Hypertension  Well-controlled.  Continue amlodipine    4.  Atrial fibrillation  Regular rhythm heard on examination today.  EKG obtained and shows new onset atrial fibrillation.  Heart rate controlled.  Overall asymptomatic.  Given [id number] score of 4 would be considered high risk for stroke.  We will start her on Eliquis 5 mg p.o. twice daily.  Advised importance of her taking this medication       This document has been @Electronically signed by MARCIAL Ness, 07/03/24, 8:41 AM EDT.       Dictated Utilizing Dragon Dictation: Part of this note may be an electronic transcription/translation of spoken language to printed text using the Dragon Dictation System.    Follow-up appointment and medication changes provided in hand delivered After Visit Summary as well as reviewed in the room.

## 2024-07-04 ENCOUNTER — NURSE TRIAGE (OUTPATIENT)
Dept: CALL CENTER | Facility: HOSPITAL | Age: 84
End: 2024-07-04
Payer: MEDICARE

## 2024-07-05 NOTE — TELEPHONE ENCOUNTER
Seen in Dr. oPe's office on Wednesday for AFIB, prescribed Eliquis, has taken one dose; urine is now pink.  Took the pill around 5pm, noticed pink urine around 6pm, waited to call the nurse line until after midnight.  Reason for Disposition   All other urine symptoms   Taking Coumadin (warfarin) or other strong blood thinner, or known bleeding disorder (e.g., thrombocytopenia)    Additional Information   Negative: Shock suspected (e.g., cold/pale/clammy skin, too weak to stand, low BP, rapid pulse)   Negative: Sounds like a life-threatening emergency to the triager   Negative: Followed a female genital area injury (e.g., labia, vagina, vulva)   Negative: Followed a male genital area injury (e.g., penis, scrotum)   Negative: Vaginal discharge   Negative: Pus (white, yellow) or bloody discharge from end of penis   Negative: [1] Taking antibiotic for urinary tract infection (UTI) AND [2] female   Negative: [1] Taking antibiotic for urinary tract infection (UTI) AND [2] male   Negative: [1] Pain or burning with passing urine (urination) AND [2] pregnant   Negative: [1] Pain or burning with passing urine (urination) AND [2] postpartum (from 0 to 6 weeks after delivery)   Negative: [1] Pain or burning with passing urine (urination) AND [2] female   Negative: [1] Pain or burning with passing urine (urination) AND [2] male   Negative: Pain or itching in the vulvar area   Negative: Pain in scrotum is main symptom   Negative: Symptoms arising from use of a urinary catheter (e.g., Coude, Mcmillan)   Negative: [1] Unable to urinate (or only a few drops) > 4 hours AND [2] bladder feels very full (e.g., palpable bladder or strong urge to urinate)   Negative: [1] Decreased urination and [2] drinking very little AND [2] dehydration suspected (e.g., dark urine, no urine > 12 hours, very dry mouth, very lightheaded)   Negative: Patient sounds very sick or weak to the triager   Negative: Fever > 100.4 F (38.0 C)   Negative: Side  "(flank) or lower back pain present   Negative: Urinating more frequently than usual (i.e., frequency)   Negative: Bad or foul-smelling urine   Negative: [1] Can't control passage of urine (i.e., urinary incontinence) AND [2] new-onset (< 2 weeks) or worsening   Negative: [1] Can't control passage of urine (i.e., urinary incontinence, wetting self) AND [2] present > 2 weeks   Negative: Urination is difficult to start (i.e., hesitancy) or straining   Negative: Dribbling (losing urine) just after finishing urination (i.e., post-void dribbling)   Negative: Has to get out of bed to urinate > 2 times a night (i.e., nocturia)   Blood in the urine is main symptom   Negative: Shock suspected (e.g., cold/pale/clammy skin, too weak to stand, low BP, rapid pulse)   Negative: Sounds like a life-threatening emergency to the triager   Negative: Urinary catheter, questions about   Negative: Urinary catheter and spinal cord injury, questions about   Negative: Hospice patient with urinary catheter   Negative: Recent back or abdominal injury   Negative: Recent genital injury   Negative: [1] Unable to urinate (or only a few drops) > 4 hours AND [2] bladder feels very full (e.g., palpable bladder or strong urge to urinate)   Negative: [1] Diffuse (all over) muscle pains in the shoulders, arms, legs, and back AND [2] dark (cola or tea-colored) or red-colored urine   Negative: Passing pure blood or large blood clots (i.e., size > a dime)  (Exception: Yanick or small strands.)   Negative: Fever > 100.4 F (38.0 C)   Negative: Patient sounds very sick or weak to the triager   Negative: [1] Pain or burning with passing urine AND [2] side (flank) or back pain present   Negative: Known sickle cell disease    Answer Assessment - Initial Assessment Questions  1. SYMPTOM: \"What's the main symptom you're concerned about?\" (e.g., frequency, incontinence)      Pink tint to urine  2. ONSET: \"When did the  pink urine  start?\"      Around 6 pm " "tonight  3. PAIN: \"Is there any pain?\" If Yes, ask: \"How bad is it?\" (Scale: 1-10; mild, moderate, severe)      denies  4. CAUSE: \"What do you think is causing the symptoms?\"      Eliquis  5. OTHER SYMPTOMS: \"Do you have any other symptoms?\" (e.g., blood in urine, fever, flank pain, pain with urination)      denies  6. PREGNANCY: \"Is there any chance you are pregnant?\" \"When was your last menstrual period?\"      na    Answer Assessment - Initial Assessment Questions  1. COLOR of URINE: \"Describe the color of the urine.\"  (e.g., tea-colored, pink, red, bloody) \"Do you have blood clots in your urine?\" (e.g., none, pea, grape, small coin)      Pink urine aprox one hour after taking eliquis  2. ONSET: \"When did the bleeding start?\"       Around 6 pm tonight  3. EPISODES: \"How many times has there been blood in the urine?\" or \"How many times today?\"      twice  4. PAIN with URINATION: \"Is there any pain with passing your urine?\" If Yes, ask: \"How bad is the pain?\"  (Scale 1-10; or mild, moderate, severe)     - MILD: Complains slightly about urination hurting.     - MODERATE: Interferes with normal activities.       - SEVERE: Excruciating, unwilling or unable to urinate because of the pain.       denies  5. FEVER: \"Do you have a fever?\" If Yes, ask: \"What is your temperature, how was it measured, and when did it start?\"      denies  6. ASSOCIATED SYMPTOMS: \"Are you passing urine more frequently than usual?\"      denies  7. OTHER SYMPTOMS: \"Do you have any other symptoms?\" (e.g., back/flank pain, abdomen pain, vomiting)      denies  8. PREGNANCY: \"Is there any chance you are pregnant?\" \"When was your last menstrual period?\"      na    Protocols used: Urinary Symptoms-ADULT-AH, Urine - Blood In-ADULT-AH    "

## 2024-07-08 ENCOUNTER — TELEPHONE (OUTPATIENT)
Dept: CARDIOLOGY | Facility: CLINIC | Age: 84
End: 2024-07-08

## 2024-07-08 NOTE — TELEPHONE ENCOUNTER
Caller: Juan José Meza    Relationship: Self    Best call back number: [phone]    Which medication are you concerned about: ELIQUIS 5 MG 2 TIMES DAILY    Who prescribed you this medication: MARCIAL BLAKE    When did you start taking this medication: 7-4-24    What are your concerns: PATIENT WAS ONLY ABLE TO TAKE 1 TABLET AND HAD REACTION TO MEDICATION. PATIENT STATES THAT SHE TOOK ONE DOSAGE AND URINE WAS BROWNISH.. NEXT TIME PATIENT WENT URINE WAS PINKISH. PATIENT CALLED HOSPITAL AND  WAS TOLD TO DRINK FLUIDS AND CALL DR NEXT DAY. AFTER PATIENT DRANK FLUIDS URINE CLEARED UP. PATIENT STATES SHE FEELS WEAK TODAY. PLEASE REACH OUT.    How long have you had these concerns: SINCE 7-3-24

## 2024-07-09 ENCOUNTER — TELEPHONE (OUTPATIENT)
Dept: CARDIOLOGY | Facility: CLINIC | Age: 84
End: 2024-07-09

## 2024-07-09 ENCOUNTER — TRANSCRIBE ORDERS (OUTPATIENT)
Dept: ADMINISTRATIVE | Facility: HOSPITAL | Age: 84
End: 2024-07-09
Payer: MEDICARE

## 2024-07-09 DIAGNOSIS — Z12.31 SCREENING MAMMOGRAM, ENCOUNTER FOR: Primary | ICD-10-CM

## 2024-07-09 NOTE — TELEPHONE ENCOUNTER
Please see phone encounter from 07/08/24, regarding Eliquis/discolor of urine. Patient states only took x1pill. What to do? Please respond.

## 2024-07-10 DIAGNOSIS — I48.91 NEW ONSET ATRIAL FIBRILLATION: Primary | ICD-10-CM

## 2024-07-10 NOTE — TELEPHONE ENCOUNTER
She is at risk for stroke without the blood thinner. If she cannot take Eliquis. She needs to take Xarelto which is a different blood thinner. I can send this in to her pharmacy.

## 2024-07-11 ENCOUNTER — TELEPHONE (OUTPATIENT)
Dept: CARDIOLOGY | Facility: CLINIC | Age: 84
End: 2024-07-11
Payer: MEDICARE

## 2024-07-11 NOTE — TELEPHONE ENCOUNTER
Caller Name: Juan José Meza      Relationship: Self      Best Contact Number: [phone]. CAN LEAVE A VOICEMAIL IF NEED TO.       Patient is requesting samples of XARELTO 20 MG      How many days of medication do you have left? NONE.         Additional Information: SAW [facility] ON JULY 3RD, DIAGNOSED WITH A-FIB. TRYING TO GET MEDS STRAIGHTENED OUT. SAMPLES OF ELOQUIS. TOOK 1 DOSE AND COULDNT TAKE IT. AFTER TAKING, PT HAD BROWN URINE AND THEN WENT TO RESTROOM AGAIN AND URINE WAS PINK. SPOKE WITH SOMEONE AT THE HOSPITAL AND WAS ADVISED TO DRINK A LOT OF WATER AND FOLLOW UP CARDIOLOGIST. HERLINDA CALLED IN XARELTO. THIS MEDICATION IS $300 WITH INSURANCE, $2000 WITHOUT, SO WE ARE REQUESTING SAMPLES. PT IS WORRIED ABOUT THE MG. WANTS TO MAKE SURE SHE CAN GET A LOW DOSE FOR THE FIRST TIME TAKING THIS MEDICATION. PATIENT IS A BIT SHAKEN UP FROM TAKING THE PREVIOUS MEDICATION AND WOULD LIKE TO SPEAK WITH PROVIDER OR NURSE IF POSSIBLE.

## 2024-07-12 NOTE — TELEPHONE ENCOUNTER
Called pt and she is going to come by to get a trial card to take to her pharmacy to see if it will work to get her Xarelto.

## 2024-07-25 ENCOUNTER — TELEPHONE (OUTPATIENT)
Dept: CARDIOLOGY | Facility: CLINIC | Age: 84
End: 2024-07-25
Payer: MEDICARE

## 2024-07-25 NOTE — TELEPHONE ENCOUNTER
Caller: Juan José Meza    Relationship to patient: Self    Best call back number: [phone]     Chief complaint: PT TRIED 2 DIFFERENT BLOOD THINNERS, THEY DID NOT WORK. PT HAVING PROBLEM DECIDING TX GOING FORWARD.     Type of visit: F/U    Requested date: ASAP     If rescheduling, when is the original appointment: 8.20.24

## 2024-08-06 ENCOUNTER — HOSPITAL ENCOUNTER (OUTPATIENT)
Dept: MAMMOGRAPHY | Facility: HOSPITAL | Age: 84
Discharge: HOME | End: 2024-08-06
Admitting: INTERNAL MEDICINE
Payer: MEDICARE

## 2024-08-06 DIAGNOSIS — Z12.31 SCREENING MAMMOGRAM, ENCOUNTER FOR: ICD-10-CM

## 2024-08-06 PROCEDURE — 77067 SCR MAMMO BI INCL CAD: CPT | Performed by: RADIOLOGY

## 2024-08-06 PROCEDURE — 77067 SCR MAMMO BI INCL CAD: CPT

## 2024-08-06 PROCEDURE — 77063 BREAST TOMOSYNTHESIS BI: CPT

## 2024-08-06 PROCEDURE — 77063 BREAST TOMOSYNTHESIS BI: CPT | Performed by: RADIOLOGY

## 2024-08-13 ENCOUNTER — OFFICE VISIT (OUTPATIENT)
Dept: CARDIOLOGY | Facility: CLINIC | Age: 84
End: 2024-08-13
Payer: MEDICARE

## 2024-08-13 VITALS
BODY MASS INDEX: 26.78 KG/M2 | WEIGHT: 136.4 LBS | HEIGHT: 60 IN | DIASTOLIC BLOOD PRESSURE: 85 MMHG | OXYGEN SATURATION: 98 % | HEART RATE: 61 BPM | SYSTOLIC BLOOD PRESSURE: 128 MMHG

## 2024-08-13 DIAGNOSIS — E78.5 DYSLIPIDEMIA: ICD-10-CM

## 2024-08-13 DIAGNOSIS — I10 ESSENTIAL HYPERTENSION: Primary | ICD-10-CM

## 2024-08-13 DIAGNOSIS — I48.91 NEW ONSET ATRIAL FIBRILLATION: ICD-10-CM

## 2024-08-13 DIAGNOSIS — I25.10 ASCVD (ARTERIOSCLEROTIC CARDIOVASCULAR DISEASE): ICD-10-CM

## 2024-08-13 PROCEDURE — 1160F RVW MEDS BY RX/DR IN RCRD: CPT | Performed by: NURSE PRACTITIONER

## 2024-08-13 PROCEDURE — 99214 OFFICE O/P EST MOD 30 MIN: CPT | Performed by: NURSE PRACTITIONER

## 2024-08-13 PROCEDURE — 1159F MED LIST DOCD IN RCRD: CPT | Performed by: NURSE PRACTITIONER

## 2024-08-13 PROCEDURE — 3074F SYST BP LT 130 MM HG: CPT | Performed by: NURSE PRACTITIONER

## 2024-08-13 PROCEDURE — 3079F DIAST BP 80-89 MM HG: CPT | Performed by: NURSE PRACTITIONER

## 2024-08-13 RX ORDER — AMLODIPINE BESYLATE 10 MG/1
10 TABLET ORAL DAILY
Qty: 60 TABLET | Refills: 2 | Status: SHIPPED | OUTPATIENT
Start: 2024-08-13

## 2024-08-13 NOTE — PROGRESS NOTES
[facility]             [facility] MARCIAL Jovel Maria C., MD  Juan José Meza  1940 08/13/2024    Patient Active Problem List   Diagnosis    Dyslipidemia    Hypertension    Chest pain    Precordial chest pain    Palpitations    Essential hypertension    Mixed hyperlipidemia    COVID-19    Microscopic hematuria    Nonrheumatic aortic (valve) stenosis    Bradycardia, sinus    ASCVD (arteriosclerotic cardiovascular disease)    New onset atrial fibrillation       Dear Bridget Segal MD:    Subjective     Chief Complaint   Patient presents with    Follow-up       HPI:     This is a 83 y.o. female with known past medical history of essential hypertension, nonobstructive coronary artery disease and dyslipidemia.        Juan José Meza presents today for routine cardiology follow up.  The patient's last visit she was diagnosed with new onset atrial fibrillation and was started on Eliquis.  She called our office after taking her first dose stating that she had pink-tinged urine and she called the ER who instructed her to increase her fluids and call her cardiologist.  She was then placed on Xarelto and states she was unable to take this as it also caused pink-tinged urine.  She was being treated for UTI at that time.  She has not been taking any blood thinner since this time.  Currently in atrial fibrillation with a controlled rate    Diagnostic Testing  Left heart catheterization 2017: Mild plaquing in the LAD otherwise unremarkable  Cardiac event monitor April 2021: Normal sinus rhythm with no arrhythmias noted  Echocardiogram 6/2021: EF greater than 70% with mild concentric hypertrophy  Nuclear stress test 6/2021: Mild fixed with mid anterior wall defect with normal wall motion with no significant reversibility consistent with breast attenuation artifact with no ischemia     All other systems were reviewed and were negative.    Patient Active Problem List    Diagnosis    Dyslipidemia    Hypertension    Chest pain    Precordial chest pain    Palpitations    Essential hypertension    Mixed hyperlipidemia    COVID-19    Microscopic hematuria    Nonrheumatic aortic (valve) stenosis    Bradycardia, sinus    ASCVD (arteriosclerotic cardiovascular disease)    New onset atrial fibrillation       family history includes Heart attack in her father and mother; Heart disease in her maternal uncle and mother.     reports that she has never smoked. She has never used smokeless tobacco. She reports that she does not drink alcohol and does not use drugs.    No Known Allergies      Current Outpatient Medications:     acetaminophen (TYLENOL) 500 MG tablet, Take 1 tablet by mouth Every 6 (Six) Hours As Needed for Mild Pain., Disp: , Rfl:     amLODIPine (NORVASC) 10 MG tablet, Take 1 tablet by mouth Daily., Disp: 60 tablet, Rfl: 2    Calcium Carbonate-Vitamin D (CALCIUM PLUS VITAMIN D PO), Take 1,200 mg by mouth Daily., Disp: , Rfl:     Cyanocobalamin (HM SUPER VITAMIN B12 PO), Take  by mouth Daily., Disp: , Rfl:     Multiple Vitamins-Minerals (Emergen-C Immune) pack, Take  by mouth As Needed., Disp: , Rfl:     Bacillus Coagulans-Inulin (Probiotic) 1-250 BILLION-MG capsule, Take  by mouth. (Patient not taking: Reported on 7/3/2024), Disp: , Rfl:     cetirizine (zyrTEC) 10 MG tablet, Take 1 tablet by mouth Daily. (Patient not taking: Reported on 8/13/2024), Disp: , Rfl:     Lysine HCl (l-lysine) 500 MG tablet tablet, Take 1 tablet by mouth Daily. (Patient not taking: Reported on 8/13/2024), Disp: , Rfl:     NON FORMULARY, Take 6,900 mg by mouth 3 times a day. PNEUMOTROPHIN pmg (Patient not taking: Reported on 7/3/2024), Disp: , Rfl:     pantoprazole (PROTONIX) 40 MG EC tablet, Take 1 tablet by mouth Daily. (Patient not taking: Reported on 1/3/2024), Disp: , Rfl:     rivaroxaban (XARELTO) 20 MG tablet, Take 1 tablet by mouth Daily With Dinner. (Patient not taking: Reported on 8/13/2024),  Disp: 30 tablet, Rfl: 5    rosuvastatin (CRESTOR) 20 MG tablet, Take 1 tablet by mouth Daily. (Patient not taking: Reported on 1/3/2024), Disp: 30 tablet, Rfl: 5    Vibegron (Gemtesa) 75 MG tablet, Take  by mouth. (Patient not taking: Reported on 1/3/2024), Disp: , Rfl:       Physical Exam:  I have reviewed the patient's current vital signs as documented in the patient's EMR.   Vitals:    08/13/24 0841   BP: 128/85   Pulse: 61   SpO2: 98%     Body mass index is 26.78 kg/m².       08/13/24  0841   Weight: 61.9 kg (136 lb 6.4 oz)      Physical Exam  Constitutional:       General: She is not in acute distress.     Appearance: Normal appearance. She is well-developed and normal weight.   HENT:      Head: Normocephalic and atraumatic.   Eyes:      General: Lids are normal.      Conjunctiva/sclera: Conjunctivae normal.      Pupils: Pupils are equal, round, and reactive to light.   Neck:      Vascular: No carotid bruit or JVD.   Cardiovascular:      Rate and Rhythm: Normal rate and regular rhythm. Rhythm irregularly irregular.      Pulses: Normal pulses.      Heart sounds: Normal heart sounds, S1 normal and S2 normal. No murmur heard.  Pulmonary:      Effort: Pulmonary effort is normal. No respiratory distress.      Breath sounds: Normal breath sounds. No wheezing.   Abdominal:      General: Bowel sounds are normal. There is no distension.      Palpations: Abdomen is soft. There is no hepatomegaly or splenomegaly.      Tenderness: There is no abdominal tenderness.   Musculoskeletal:         General: No swelling. Normal range of motion.      Cervical back: Normal range of motion and neck supple.      Right lower leg: No edema.      Left lower leg: No edema.   Skin:     General: Skin is warm and dry.      Coloration: Skin is not jaundiced.      Findings: No rash.   Neurological:      General: No focal deficit present.      Mental Status: She is alert and oriented to person, place, and time. Mental status is at baseline.  "  Psychiatric:         Mood and Affect: Mood normal.         Speech: Speech normal.         Behavior: Behavior normal.         Thought Content: Thought content normal.         Judgment: Judgment normal.            DATA REVIEWED:     TTE/MARIAH:  Results for orders placed during the hospital encounter of 06/14/21    Adult Transthoracic Echo Complete w/ Color, Spectral and Contrast if necessary per protocol    Interpretation Summary  · Left ventricular diastolic function is consistent with age.  · Left ventricular wall thickness is consistent with mild concentric hypertrophy.  · Left ventricular ejection fraction appears to be greater than 70%. Left ventricular systolic function is normal.  · Mild aortic valve stenosis is present.  · Estimated right ventricular systolic pressure from tricuspid regurgitation is moderately elevated (45-55 mmHg).      Laboratory evaluations:    Lab Results   Component Value Date    CREATININE 0.80 04/14/2021     No results found for: \"WBC\", \"HGB\", \"HCT\", \"MCV\", \"PLT\"  No results found for: \"CHOL\", \"CHLPL\", \"TRIG\", \"HDL\", \"LDL\", \"LDLDIRECT\"  No results found for: \"TSH\", \"F6XQFPD\", \"I4UMPTX\", \"THYROIDAB\"  No results found for: \"HGBA1C\"  No results found for: \"ALT\"  No results found for: \"HGBA1C\"  Lab Results   Component Value Date    CREATININE 0.80 04/14/2021     No results found for: \"IRON\", \"TIBC\", \"FERRITIN\"  No results found for: \"INR\", \"PROTIME\"        --------------------------------------------------------------------------------------------------------------------------    ASSESSMENT/PLAN:      Diagnosis Plan   1. Essential hypertension  amLODIPine (NORVASC) 10 MG tablet      2. New onset atrial fibrillation        3. Dyslipidemia        4. ASCVD (arteriosclerotic cardiovascular disease)            ASCVD  Dyslipidemia  Denies any chest pain.  Lab work from July showed LDL in the 130s.  Patient unable to tolerate statins due to myalgias.  Did discuss about initiation of PCSK9 however " she is not interested in this.    Hypertension  Well-controlled.  Continue amlodipine    4.  Atrial fibrillation  Currently in atrial fibrillation with a controlled rate.  Rate control strategy.  Patient was placed on Eliquis at her last visit given high [id number] score of 4.  She stopped taking after her first dose due to pink tinged urine.  States this scared her therefore she stopped it.  She then tried Xarelto and had the same side effect.  She also was being treated for UTI at that time.  I did discuss with her about the need for anticoagulation in setting of atrial fibrillation.  After further discussion we have agreed to go back on Eliquis 5 mg p.o. twice daily and to monitor for any bleeding.  If she has further hematuria to call our office and we will check CBC as recent CBC showed stable hemoglobin.  She also has follow-up with urology tomorrow for which she will discuss with them.        This document has been @Electronically signed by MARCIAL Ness, 08/13/24, 8:18 AM EDT.       Dictated Utilizing Dragon Dictation: Part of this note may be an electronic transcription/translation of spoken language to printed text using the Dragon Dictation System.    Follow-up appointment and medication changes provided in hand delivered After Visit Summary as well as reviewed in the room.

## 2024-08-14 ENCOUNTER — OFFICE VISIT (OUTPATIENT)
Dept: UROLOGY | Facility: CLINIC | Age: 84
End: 2024-08-14
Payer: MEDICARE

## 2024-08-14 VITALS
HEIGHT: 60 IN | WEIGHT: 137.2 LBS | SYSTOLIC BLOOD PRESSURE: 141 MMHG | DIASTOLIC BLOOD PRESSURE: 84 MMHG | HEART RATE: 93 BPM | BODY MASS INDEX: 26.93 KG/M2

## 2024-08-14 DIAGNOSIS — R31.0 GROSS HEMATURIA: Primary | ICD-10-CM

## 2024-08-14 DIAGNOSIS — N32.81 OAB (OVERACTIVE BLADDER): ICD-10-CM

## 2024-08-14 DIAGNOSIS — N39.46 MIXED INCONTINENCE: ICD-10-CM

## 2024-08-14 LAB
BILIRUB BLD-MCNC: NEGATIVE MG/DL
CLARITY, POC: CLEAR
COLOR UR: YELLOW
EXPIRATION DATE: ABNORMAL
GLUCOSE UR STRIP-MCNC: NEGATIVE MG/DL
KETONES UR QL: NEGATIVE
LEUKOCYTE EST, POC: ABNORMAL
Lab: ABNORMAL
NITRITE UR-MCNC: NEGATIVE MG/ML
PH UR: 6 [PH] (ref 5–8)
PROT UR STRIP-MCNC: NEGATIVE MG/DL
RBC # UR STRIP: ABNORMAL /UL
SP GR UR: 1 (ref 1–1.03)
UROBILINOGEN UR QL: NORMAL

## 2024-08-14 PROCEDURE — 87086 URINE CULTURE/COLONY COUNT: CPT

## 2024-08-14 NOTE — PROGRESS NOTES
"Chief Complaint:    Chief Complaint   Patient presents with    Urinary Frequency    gross hematuria     New patient     Urinary Tract Infection       Vital Signs:   /84   Pulse 93   Ht 152 cm (59.84\")   Wt 62.2 kg (137 lb 3.2 oz)   BMI 26.94 kg/m²   Body mass index is 26.94 kg/m².      HPI:  Juan José Meza is a 83 y.o. female who presents today for initial evaluation     History of Present Illness  Ms. Meza presents to the clinic to reestablish care.  She has been referred back to us by Maria Isabel san PA-C.  She was initially referred to us in 2021 and was seen by Dr. Méndez at that time due to an abnormal FISH test.  She has a G1, P1 history with a tubal ligation.  She is having microscopic hematuria at that time.  She underwent a CT scan of the abdomen pelvis with contrast that was unremarkable.  This was followed up with a negative cystoscopy.  She has not been seen since.  She was recently diagnosed with acute urinary tract infection and put on ciprofloxacin at the beginning of July.  She was also started on Eliquis and Plavix for A-fib however began to have gross hematuria and discontinued the medications.  She endorses 1 episode of gross hematuria on July 22, 2024 but has not had any since.  She denies any burning with urination.  She does endorse some frequency, urgency, stress/urge incontinence, and nocturia x 3-4.  She has been on Gemtesa previously for overactive bladder.  She was recently prescribed oxybutynin by her PCP however did not feel comfortable starting medications.  Urine analysis in office today does show 2+ leukocytes and 3+ blood.  No signs of gross hematuria.  Urinary Frequency   Associated symptoms include frequency and hematuria. Pertinent negatives include no chills, nausea, urgency or vomiting.       Past Medical History:  Past Medical History:   Diagnosis Date    Chest pain     Dyslipidemia     Environmental allergies     Hypertension        Current Meds:  Current " Outpatient Medications   Medication Sig Dispense Refill    acetaminophen (TYLENOL) 500 MG tablet Take 1 tablet by mouth Every 6 (Six) Hours As Needed for Mild Pain.      amLODIPine (NORVASC) 10 MG tablet Take 1 tablet by mouth Daily. 60 tablet 2    Bacillus Coagulans-Inulin (Probiotic) 1-250 BILLION-MG capsule Take  by mouth.      Calcium Carbonate-Vitamin D (CALCIUM PLUS VITAMIN D PO) Take 1,200 mg by mouth Daily.      cetirizine (zyrTEC) 10 MG tablet Take 1 tablet by mouth Daily.      Cyanocobalamin (HM SUPER VITAMIN B12 PO) Take  by mouth Daily.      Lysine HCl (l-lysine) 500 MG tablet tablet Take 1 tablet by mouth Daily.      Multiple Vitamins-Minerals (Emergen-C Immune) pack Take  by mouth As Needed.      NON FORMULARY Take 6,900 mg by mouth 3 times a day. PNEUMOTROPHIN pmg      pantoprazole (PROTONIX) 40 MG EC tablet Take 1 tablet by mouth Daily.      rivaroxaban (XARELTO) 20 MG tablet Take 1 tablet by mouth Daily With Dinner. 30 tablet 5    rosuvastatin (CRESTOR) 20 MG tablet Take 1 tablet by mouth Daily. 30 tablet 5    Vibegron 75 MG tablet Take 1 tablet by mouth Every Night. 28 tablet 0     No current facility-administered medications for this visit.        Allergies:   No Known Allergies     Past Surgical History:  Past Surgical History:   Procedure Laterality Date    CATARACT EXTRACTION Bilateral     OCTOBER AND DECEMBER    CHOLECYSTECTOMY      TONSILLECTOMY         Social History:  Social History     Socioeconomic History    Marital status:    Tobacco Use    Smoking status: Never    Smokeless tobacco: Never   Vaping Use    Vaping status: Never Used   Substance and Sexual Activity    Alcohol use: No    Drug use: No    Sexual activity: Defer       Family History:  Family History   Problem Relation Age of Onset    Heart disease Mother     Heart attack Mother     Heart attack Father     Heart disease Maternal Uncle     Breast cancer Neg Hx        Review of Systems:  Review of Systems   Constitutional:   Positive for fatigue. Negative for chills, fever and unexpected weight change.   HENT:  Positive for sinus pressure. Negative for congestion.    Respiratory:  Negative for chest tightness and shortness of breath.    Cardiovascular:  Negative for chest pain.   Gastrointestinal:  Positive for abdominal pain, constipation and diarrhea. Negative for nausea and vomiting.   Genitourinary:  Positive for frequency and hematuria. Negative for difficulty urinating, dysuria, pelvic pain and urgency.   Musculoskeletal:  Positive for back pain. Negative for neck pain.   Skin:  Negative for rash.   Allergic/Immunologic: Negative for food allergies.   Neurological:  Positive for headaches. Negative for dizziness.   Hematological:  Bruises/bleeds easily.   Psychiatric/Behavioral:  Negative for confusion and suicidal ideas. The patient is not nervous/anxious.        Physical Exam:  Physical Exam  Constitutional:       General: She is not in acute distress.     Appearance: Normal appearance.   HENT:      Head: Normocephalic and atraumatic.      Nose: Nose normal.      Mouth/Throat:      Mouth: Mucous membranes are moist.   Eyes:      Conjunctiva/sclera: Conjunctivae normal.   Cardiovascular:      Rate and Rhythm: Normal rate and regular rhythm.      Pulses: Normal pulses.      Heart sounds: Normal heart sounds.   Pulmonary:      Effort: Pulmonary effort is normal.      Breath sounds: Normal breath sounds.   Abdominal:      General: Bowel sounds are normal.      Palpations: Abdomen is soft.   Musculoskeletal:         General: Normal range of motion.      Cervical back: Normal range of motion.   Skin:     General: Skin is warm.   Neurological:      General: No focal deficit present.      Mental Status: She is alert and oriented to person, place, and time.   Psychiatric:         Mood and Affect: Mood normal.         Behavior: Behavior normal.         Thought Content: Thought content normal.         Judgment: Judgment normal.        Recent Image (CT and/or KUB):   CT Abdomen and Pelvis: No results found for this or any previous visit.     CT Stone Protocol: No results found for this or any previous visit.     KUB: No results found for this or any previous visit.       Labs:  Brief Urine Lab Results  (Last result in the past 365 days)        Color   Clarity   Blood   Leuk Est   Nitrite   Protein   CREAT   Urine HCG        08/14/24 0951 Yellow   Clear   3+   Moderate (2+)   Negative   Negative                 Office Visit on 08/14/2024   Component Date Value Ref Range Status    Color 08/14/2024 Yellow  Yellow, Straw, Dark Yellow, Linda Final    Clarity, UA 08/14/2024 Clear  Clear Final    Specific Gravity  08/14/2024 1.005  1.005 - 1.030 Final    pH, Urine 08/14/2024 6.0  5.0 - 8.0 Final    Leukocytes 08/14/2024 Moderate (2+) (A)  Negative Final    Nitrite, UA 08/14/2024 Negative  Negative Final    Protein, POC 08/14/2024 Negative  Negative mg/dL Final    Glucose, UA 08/14/2024 Negative  Negative mg/dL Final    Ketones, UA 08/14/2024 Negative  Negative Final    Urobilinogen, UA 08/14/2024 Normal  Normal, 0.2 E.U./dL Final    Bilirubin 08/14/2024 Negative  Negative Final    Blood, UA 08/14/2024 3+ (A)  Negative Final    Lot Number 08/14/2024 n   Final    Expiration Date 08/14/2024 n   Final        Procedure: None  Procedures     I have reviewed and agree with the above PMH, PSH, FMH, social history, medications, allergies, and labs.     Assessment/Plan:   Problem List Items Addressed This Visit       Gross hematuria - Primary    Relevant Orders    POC Urinalysis Dipstick, Automated (Completed)    Urine Culture - Urine, Urine, Random Void    Mixed incontinence    Relevant Medications    Vibegron 75 MG tablet     Other Visit Diagnoses       OAB (overactive bladder)        Relevant Medications    Vibegron 75 MG tablet            Health Maintenance:   Health Maintenance Due   Topic Date Due    LIPID PANEL  Never done    COLORECTAL CANCER  SCREENING  Never done    TDAP/TD VACCINES (1 - Tdap) Never done    ZOSTER VACCINE (1 of 2) Never done    RSV Vaccine - Adults (1 - 1-dose 60+ series) Never done    Pneumococcal Vaccine 65+ (1 of 1 - PCV) Never done    ANNUAL WELLNESS VISIT  Never done    BMI FOLLOWUP  04/12/2022    COVID-19 Vaccine (1 - 2023-24 season) Never done    DXA SCAN  05/13/2024    INFLUENZA VACCINE  08/01/2024        Smoking Counseling: Never smoked or use smokeless tobacco    Urine Incontinence: Patient reports that she is experiencing symptoms of urinary incontinence requiring 3-4 pads per day.    Patient was given instructions and counseling regarding her condition or for health maintenance advice. Please see specific information pulled into the AVS if appropriate.    Patient Education:   Gross hematuria -discussed with the patient the pathophysiology of this condition in detail.  Discussed causes which can include but not limited to nephrolithiasis, renal cell carcinoma, bladder carcinoma, ureteral carcinoma, urethral carcinoma, bladder stone, acute urinary tract infection, cystitis, radiation cystitis, interstitial cystitis, and other urological abnormalities.  Urinalysis does show concerns of possible UTI and I will send this off for culture.  Given patient is asymptomatic we will hold off on antibiotics.  I will call with urine culture results once available.  Did discuss repeat workup including a CT scan abdomen pelvis with and without contrast.  Also discussed repeat lower tract investigation if CT scan is unremarkable for cystoscopy.  She did have recent lab work completed by her PCP and will obtain this before scheduling a CT scan with contrast.  Urinary incontinence -discussed with the patient the causes of urinary incontinence which can include but are not limited to detrusor instability, overactive bladder, urge incontinence, stress incontinence, mixed incontinence, urinary tract infections, overflow incontinence, bladder  prolapse, or other urological abnormalities.  I discussed ways to help treat urinary incontinence which include but are not limited to medications versus surgery.  Patient has been on Gemtesa in the past and recommend to restart medications.  I will give her a sample in office today and advised her to discontinue if she begins to experience increasing difficulty urinating, weak stream, inability urinate, or decreased urinary output.  Otherwise we will place her back in a month pending CT result    Visit Diagnoses:    ICD-10-CM ICD-9-CM   1. Gross hematuria  R31.0 599.71   2. Mixed incontinence  N39.46 788.33   3. OAB (overactive bladder)  N32.81 596.51     A total of 30 minutes were spent coordinating this patient’s care in clinic today; 15 minutes of which were face-to-face with the patient, reviewing medical history and counseling on the current treatment and followup plan.  All questions were answered to patient's satisfaction.    Meds Ordered During Visit:  New Medications Ordered This Visit   Medications    Vibegron 75 MG tablet     Sig: Take 1 tablet by mouth Every Night.     Dispense:  28 tablet     Refill:  0       Follow Up Appointment: 1 month  No follow-ups on file.      This document has been electronically signed by John Abad PA-C   August 14, 2024 10:28 EDT    Part of this note may be an electronic transcription/translation of spoken language to printed text using the Dragon Dictation System.

## 2024-08-15 ENCOUNTER — TELEPHONE (OUTPATIENT)
Dept: UROLOGY | Facility: CLINIC | Age: 84
End: 2024-08-15
Payer: MEDICARE

## 2024-08-15 LAB — BACTERIA SPEC AEROBE CULT: NO GROWTH

## 2024-08-15 NOTE — TELEPHONE ENCOUNTER
Attempted to call patient about urine culture results however she did not answer.  Did leave a voicemail advising culture was unremarkable and recommended to call back with questions or concerns.

## 2024-09-05 ENCOUNTER — HOSPITAL ENCOUNTER (OUTPATIENT)
Dept: MAMMOGRAPHY | Facility: HOSPITAL | Age: 84
Discharge: HOME | End: 2024-09-05
Payer: MEDICARE

## 2024-09-05 ENCOUNTER — HOSPITAL ENCOUNTER (OUTPATIENT)
Dept: ULTRASOUND IMAGING | Facility: HOSPITAL | Age: 84
Discharge: HOME | End: 2024-09-05
Payer: MEDICARE

## 2024-09-12 ENCOUNTER — HOSPITAL ENCOUNTER (OUTPATIENT)
Dept: MAMMOGRAPHY | Facility: HOSPITAL | Age: 84
Discharge: HOME | End: 2024-09-12
Payer: MEDICARE

## 2024-09-12 ENCOUNTER — HOSPITAL ENCOUNTER (OUTPATIENT)
Dept: ULTRASOUND IMAGING | Facility: HOSPITAL | Age: 84
Discharge: HOME | End: 2024-09-12
Payer: MEDICARE

## 2024-09-12 DIAGNOSIS — R92.8 ABNORMAL MAMMOGRAM: ICD-10-CM

## 2024-09-12 PROCEDURE — G0279 TOMOSYNTHESIS, MAMMO: HCPCS

## 2024-09-12 PROCEDURE — 76642 ULTRASOUND BREAST LIMITED: CPT

## 2024-09-12 PROCEDURE — 77065 DX MAMMO INCL CAD UNI: CPT

## 2024-09-17 ENCOUNTER — OFFICE VISIT (OUTPATIENT)
Dept: UROLOGY | Facility: CLINIC | Age: 84
End: 2024-09-17
Payer: MEDICARE

## 2024-09-17 VITALS
WEIGHT: 135 LBS | SYSTOLIC BLOOD PRESSURE: 119 MMHG | DIASTOLIC BLOOD PRESSURE: 86 MMHG | HEIGHT: 60 IN | BODY MASS INDEX: 26.5 KG/M2 | HEART RATE: 140 BPM

## 2024-09-17 DIAGNOSIS — R31.29 MICROSCOPIC HEMATURIA: ICD-10-CM

## 2024-09-17 DIAGNOSIS — R35.0 FREQUENCY OF MICTURITION: Primary | ICD-10-CM

## 2024-09-17 DIAGNOSIS — N39.46 MIXED INCONTINENCE: ICD-10-CM

## 2024-09-17 DIAGNOSIS — R31.9 HEMATURIA, UNSPECIFIED TYPE: ICD-10-CM

## 2024-09-17 PROBLEM — R31.0 GROSS HEMATURIA: Status: RESOLVED | Noted: 2024-08-14 | Resolved: 2024-09-17

## 2024-09-17 LAB
BILIRUB BLD-MCNC: NEGATIVE MG/DL
CLARITY, POC: CLEAR
COLOR UR: YELLOW
EXPIRATION DATE: ABNORMAL
GLUCOSE UR STRIP-MCNC: NEGATIVE MG/DL
KETONES UR QL: NEGATIVE
LEUKOCYTE EST, POC: ABNORMAL
Lab: ABNORMAL
NITRITE UR-MCNC: NEGATIVE MG/ML
PH UR: 6 [PH] (ref 5–8)
PROT UR STRIP-MCNC: ABNORMAL MG/DL
RBC # UR STRIP: ABNORMAL /UL
SP GR UR: 1.01 (ref 1–1.03)
UROBILINOGEN UR QL: NORMAL

## 2024-09-17 PROCEDURE — 87086 URINE CULTURE/COLONY COUNT: CPT

## 2024-09-17 RX ORDER — MECLIZINE HCL 12.5 MG 12.5 MG/1
12.5 TABLET ORAL AS NEEDED
COMMUNITY
Start: 2024-08-15

## 2024-09-17 RX ORDER — NITROFURANTOIN 25; 75 MG/1; MG/1
100 CAPSULE ORAL 2 TIMES DAILY
Qty: 10 CAPSULE | Refills: 0 | Status: SHIPPED | OUTPATIENT
Start: 2024-09-17

## 2024-09-18 ENCOUNTER — TELEPHONE (OUTPATIENT)
Dept: UROLOGY | Facility: CLINIC | Age: 84
End: 2024-09-18
Payer: MEDICARE

## 2024-09-18 LAB — BACTERIA SPEC AEROBE CULT: NO GROWTH

## 2024-10-01 ENCOUNTER — HOSPITAL ENCOUNTER (OUTPATIENT)
Dept: CT IMAGING | Facility: HOSPITAL | Age: 84
Discharge: HOME | End: 2024-10-01
Payer: MEDICARE

## 2024-10-01 DIAGNOSIS — R31.9 HEMATURIA, UNSPECIFIED TYPE: ICD-10-CM

## 2024-10-01 PROCEDURE — 74176 CT ABD & PELVIS W/O CONTRAST: CPT

## 2024-10-02 ENCOUNTER — TELEPHONE (OUTPATIENT)
Dept: UROLOGY | Facility: CLINIC | Age: 84
End: 2024-10-02
Payer: MEDICARE

## 2024-10-02 NOTE — TELEPHONE ENCOUNTER
Called patient advised her CT scan abdomen pelvis was unremarkable other than some very mild left hydronephrosis.  There was no concerns of any kidney stones, renal masses, or bladder mass.  No obstructive uropathy was noted.  She denies any significant pain over the phone advised her to keep follow-up or return to the clinic sooner if needed.  Otherwise we will see her back in December.  She verbalized understanding.

## 2024-10-08 ENCOUNTER — OFFICE VISIT (OUTPATIENT)
Dept: CARDIOLOGY | Facility: CLINIC | Age: 84
End: 2024-10-08
Payer: MEDICARE

## 2024-10-08 VITALS
HEART RATE: 97 BPM | SYSTOLIC BLOOD PRESSURE: 140 MMHG | DIASTOLIC BLOOD PRESSURE: 87 MMHG | HEIGHT: 60 IN | WEIGHT: 134.8 LBS | OXYGEN SATURATION: 97 % | BODY MASS INDEX: 26.46 KG/M2 | RESPIRATION RATE: 16 BRPM

## 2024-10-08 DIAGNOSIS — I48.19 ATRIAL FIBRILLATION, PERSISTENT: ICD-10-CM

## 2024-10-08 DIAGNOSIS — I25.10 ASCVD (ARTERIOSCLEROTIC CARDIOVASCULAR DISEASE): ICD-10-CM

## 2024-10-08 DIAGNOSIS — I10 ESSENTIAL HYPERTENSION: ICD-10-CM

## 2024-10-08 DIAGNOSIS — E78.2 MIXED HYPERLIPIDEMIA: Primary | ICD-10-CM

## 2024-10-08 PROCEDURE — 3079F DIAST BP 80-89 MM HG: CPT | Performed by: NURSE PRACTITIONER

## 2024-10-08 PROCEDURE — 99214 OFFICE O/P EST MOD 30 MIN: CPT | Performed by: NURSE PRACTITIONER

## 2024-10-08 PROCEDURE — 3077F SYST BP >= 140 MM HG: CPT | Performed by: NURSE PRACTITIONER

## 2024-10-08 PROCEDURE — 1159F MED LIST DOCD IN RCRD: CPT | Performed by: NURSE PRACTITIONER

## 2024-10-08 PROCEDURE — 1160F RVW MEDS BY RX/DR IN RCRD: CPT | Performed by: NURSE PRACTITIONER

## 2024-10-08 RX ORDER — LISINOPRIL 10 MG/1
10 TABLET ORAL DAILY
Qty: 30 TABLET | Refills: 3 | Status: SHIPPED | OUTPATIENT
Start: 2024-10-08

## 2024-10-08 NOTE — PROGRESS NOTES
[facility]             [facility] MARCIAL Jovel Maria C., MD  Juan José Meza  1940  10/08/2024    Patient Active Problem List   Diagnosis    Dyslipidemia    Hypertension    Chest pain    Precordial chest pain    Palpitations    Essential hypertension    Mixed hyperlipidemia    COVID-19    Microscopic hematuria    Nonrheumatic aortic (valve) stenosis    Bradycardia, sinus    ASCVD (arteriosclerotic cardiovascular disease)    Atrial fibrillation, persistent    Mixed incontinence       Dear Bridget Segal MD:    Subjective     Chief Complaint   Patient presents with    Follow-up     8 weeks    Med Management     List provided       HPI:     This is a 83 y.o. female with known past medical history of essential hypertension, nonobstructive coronary artery disease and dyslipidemia.       Juan José Meza presents today for routine cardiology follow up.  Patient states she has been doing overall well since her last visit.  Has decided to not take any anticoagulation at this time.  Is aware of stroke risk.  Remains in atrial fibrillation with a controlled rate.  Denies any chest pain or shortness of breath.  Stopped amlodipine secondary to lower extremity edema.  Recent lab work shows stable kidney function electrolytes.  Blood pressure stable.    Diagnostic Testing  Left heart catheterization 2017: Mild plaquing in the LAD otherwise unremarkable  Cardiac event monitor April 2021: Normal sinus rhythm with no arrhythmias noted  Echocardiogram 6/2021: EF greater than 70% with mild concentric hypertrophy  Nuclear stress test 6/2021: Mild fixed with mid anterior wall defect with normal wall motion with no significant reversibility consistent with breast attenuation artifact with no ischemia     All other systems were reviewed and were negative.    Patient Active Problem List   Diagnosis    Dyslipidemia    Hypertension    Chest pain    Precordial chest pain     Palpitations    Essential hypertension    Mixed hyperlipidemia    COVID-19    Microscopic hematuria    Nonrheumatic aortic (valve) stenosis    Bradycardia, sinus    ASCVD (arteriosclerotic cardiovascular disease)    Atrial fibrillation, persistent    Mixed incontinence       family history includes Heart attack in her father and mother; Heart disease in her maternal uncle and mother.     reports that she has never smoked. She has never used smokeless tobacco. She reports that she does not drink alcohol and does not use drugs.    No Known Allergies      Current Outpatient Medications:     acetaminophen (TYLENOL) 500 MG tablet, Take 1 tablet by mouth Every 6 (Six) Hours As Needed for Mild Pain., Disp: , Rfl:     Bacillus Coagulans-Inulin (Probiotic) 1-250 BILLION-MG capsule, Take  by mouth., Disp: , Rfl:     Calcium Carbonate-Vitamin D (CALCIUM PLUS VITAMIN D PO), Take 1,200 mg by mouth Daily., Disp: , Rfl:     Cyanocobalamin (HM SUPER VITAMIN B12 PO), Take  by mouth Daily., Disp: , Rfl:     Multiple Vitamins-Minerals (Emergen-C Immune) pack, Take  by mouth As Needed., Disp: , Rfl:     cetirizine (zyrTEC) 10 MG tablet, Take 1 tablet by mouth Daily. (Patient not taking: Reported on 10/8/2024), Disp: , Rfl:     lisinopril (PRINIVIL,ZESTRIL) 10 MG tablet, Take 1 tablet by mouth Daily., Disp: 30 tablet, Rfl: 3    Lysine HCl (l-lysine) 500 MG tablet tablet, Take 1 tablet by mouth Daily. (Patient not taking: Reported on 10/8/2024), Disp: , Rfl:     meclizine (ANTIVERT) 12.5 MG tablet, Take 1 tablet by mouth As Needed. (Patient not taking: Reported on 10/8/2024), Disp: , Rfl:     nitrofurantoin, macrocrystal-monohydrate, (Macrobid) 100 MG capsule, Take 1 capsule by mouth 2 (Two) Times a Day. (Patient not taking: Reported on 10/8/2024), Disp: 10 capsule, Rfl: 0    NON FORMULARY, Take 6,900 mg by mouth 3 times a day. PNEUMOTROPHIN pmg, Disp: , Rfl:     pantoprazole (PROTONIX) 40 MG EC tablet, Take 1 tablet by mouth Daily.  (Patient not taking: Reported on 10/8/2024), Disp: , Rfl:     rivaroxaban (XARELTO) 20 MG tablet, Take 1 tablet by mouth Daily With Dinner. (Patient not taking: Reported on 10/8/2024), Disp: 30 tablet, Rfl: 5    rosuvastatin (CRESTOR) 20 MG tablet, Take 1 tablet by mouth Daily. (Patient not taking: Reported on 10/8/2024), Disp: 30 tablet, Rfl: 5    Vibegron 75 MG tablet, Take 1 tablet by mouth Every Night. (Patient not taking: Reported on 10/8/2024), Disp: 28 tablet, Rfl: 0      Physical Exam:  I have reviewed the patient's current vital signs as documented in the patient's EMR.   Vitals:    10/08/24 0948   BP: 140/87   Pulse: 97   Resp: 16   SpO2: 97%     Body mass index is 26.33 kg/m².       10/08/24  0948   Weight: 61.1 kg (134 lb 12.8 oz)      Physical Exam  Constitutional:       General: She is not in acute distress.     Appearance: Normal appearance. She is well-developed and normal weight.   HENT:      Head: Normocephalic and atraumatic.   Eyes:      General: Lids are normal.      Conjunctiva/sclera: Conjunctivae normal.      Pupils: Pupils are equal, round, and reactive to light.   Neck:      Vascular: No carotid bruit or JVD.   Cardiovascular:      Rate and Rhythm: Normal rate and regular rhythm.      Pulses: Normal pulses.      Heart sounds: Normal heart sounds, S1 normal and S2 normal. No murmur heard.  Pulmonary:      Effort: Pulmonary effort is normal. No respiratory distress.      Breath sounds: Normal breath sounds. No wheezing.   Abdominal:      General: Bowel sounds are normal. There is no distension.      Palpations: Abdomen is soft. There is no hepatomegaly or splenomegaly.      Tenderness: There is no abdominal tenderness.   Musculoskeletal:         General: No swelling. Normal range of motion.      Cervical back: Normal range of motion and neck supple.      Right lower leg: No edema.      Left lower leg: No edema.   Skin:     General: Skin is warm and dry.      Coloration: Skin is not jaundiced.  "     Findings: No rash.   Neurological:      General: No focal deficit present.      Mental Status: She is alert and oriented to person, place, and time. Mental status is at baseline.   Psychiatric:         Mood and Affect: Mood normal.         Speech: Speech normal.         Behavior: Behavior normal.         Thought Content: Thought content normal.         Judgment: Judgment normal.            DATA REVIEWED:     TTE/MARIAH:  Results for orders placed during the hospital encounter of 06/14/21    Adult Transthoracic Echo Complete w/ Color, Spectral and Contrast if necessary per protocol    Interpretation Summary  · Left ventricular diastolic function is consistent with age.  · Left ventricular wall thickness is consistent with mild concentric hypertrophy.  · Left ventricular ejection fraction appears to be greater than 70%. Left ventricular systolic function is normal.  · Mild aortic valve stenosis is present.  · Estimated right ventricular systolic pressure from tricuspid regurgitation is moderately elevated (45-55 mmHg).      Laboratory evaluations:    Lab Results   Component Value Date    CREATININE 0.80 04/14/2021     No results found for: \"WBC\", \"HGB\", \"HCT\", \"MCV\", \"PLT\"  No results found for: \"CHOL\", \"CHLPL\", \"TRIG\", \"HDL\", \"LDL\", \"LDLDIRECT\"  No results found for: \"TSH\", \"E2YDGRH\", \"T1PXGGW\", \"THYROIDAB\"  No results found for: \"HGBA1C\"  No results found for: \"ALT\"  No results found for: \"HGBA1C\"  Lab Results   Component Value Date    CREATININE 0.80 04/14/2021     No results found for: \"IRON\", \"TIBC\", \"FERRITIN\"  No results found for: \"INR\", \"PROTIME\"        --------------------------------------------------------------------------------------------------------------------------    ASSESSMENT/PLAN:      Diagnosis Plan   1. Mixed hyperlipidemia        2. Essential hypertension        3. ASCVD (arteriosclerotic cardiovascular disease)        4. Atrial fibrillation, persistent            ASCVD  Dyslipidemia  Denies " any chest pain.  Recent lab work showed LDL was not at goal.  Patient unable to tolerate statins due to myalgias.  She is not interested in starting PCSK9.    Hypertension  Stopped amlodipine secondary to swelling of her lower extremities.  Will start her on lisinopril 10 mg daily and monitor blood pressure.  Recent lab work stable    4.  Atrial fibrillation  Currently in atrial fibrillation with a controlled rate.  Rate control strategy only per patient.  Patient has been placed on Eliquis in the past given high [id number] score and this was stopped due to having pink-tinged urine.  She was then placed on Xarelto and had the same side effect and stopped this.  Discussed with her about the need for anticoagulation in the setting of atrial fibrillation.  She is aware of stroke risk and has decided to discontinue anticoagulation altogether.      This document has been @Electronically signed by MARCIAL Ness, 10/08/24, 9:17 AM EDT.       Dictated Utilizing Dragon Dictation: Part of this note may be an electronic transcription/translation of spoken language to printed text using the Dragon Dictation System.    Follow-up appointment and medication changes provided in hand delivered After Visit Summary as well as reviewed in the room.

## 2024-12-17 ENCOUNTER — OFFICE VISIT (OUTPATIENT)
Dept: UROLOGY | Facility: CLINIC | Age: 84
End: 2024-12-17
Payer: MEDICARE

## 2024-12-17 VITALS
SYSTOLIC BLOOD PRESSURE: 164 MMHG | HEART RATE: 89 BPM | WEIGHT: 130.6 LBS | HEIGHT: 60 IN | DIASTOLIC BLOOD PRESSURE: 89 MMHG | BODY MASS INDEX: 25.64 KG/M2

## 2024-12-17 DIAGNOSIS — R31.29 MICROSCOPIC HEMATURIA: Primary | ICD-10-CM

## 2024-12-17 DIAGNOSIS — R35.0 FREQUENCY OF MICTURITION: ICD-10-CM

## 2024-12-17 LAB
BILIRUB BLD-MCNC: NEGATIVE MG/DL
CLARITY, POC: CLEAR
COLOR UR: YELLOW
EXPIRATION DATE: ABNORMAL
GLUCOSE UR STRIP-MCNC: NEGATIVE MG/DL
KETONES UR QL: NEGATIVE
LEUKOCYTE EST, POC: ABNORMAL
Lab: ABNORMAL
NITRITE UR-MCNC: NEGATIVE MG/ML
PH UR: 6 [PH] (ref 5–8)
PROT UR STRIP-MCNC: NEGATIVE MG/DL
RBC # UR STRIP: ABNORMAL /UL
SP GR UR: 1.02 (ref 1–1.03)
UROBILINOGEN UR QL: NORMAL

## 2024-12-17 PROCEDURE — 87086 URINE CULTURE/COLONY COUNT: CPT

## 2024-12-17 NOTE — PROGRESS NOTES
"Chief Complaint:    Chief Complaint   Patient presents with    Frequency of micturition     3 month follow up         Vital Signs:   /89 (BP Location: Right arm, Patient Position: Sitting, Cuff Size: Adult)   Pulse 89   Ht 152.4 cm (60\")   Wt 59.2 kg (130 lb 9.6 oz)   BMI 25.51 kg/m²   Body mass index is 25.51 kg/m².      HPI:  Juan José Meza is a 84 y.o. female who presents today for follow up    History of Present Illness  Ms. horn returns to the clinic today for follow-up for hematuria and mixed/urge urinary incontinence with overactive bladder.  She was initially seen secondary to gross hematuria for which she was started on antibiotics and had improvement.  Last office visit she continued to have microscopic blood but has not seen any gross hematuria.  She had recent lab work completed in November by her PCP that showed white blood cell count of 7.89, creatinine of 0.98, BUN of 14, and stable GFR 57.7.  She is having some low back/sciatic pain and right hip pain. She has some mild dysuria but denies hematuria or difficulty with urinating. Urine shows 2+ microscopic blood and 3+ Leukocytes. Last two urine cultures showed no growth. CT scan in October was unremarkable other than mild left hydronephrosis      Past Medical History:  Past Medical History:   Diagnosis Date    Chest pain     Dyslipidemia     Environmental allergies     Hypertension        Current Meds:  Current Outpatient Medications   Medication Sig Dispense Refill    acetaminophen (TYLENOL) 500 MG tablet Take 1 tablet by mouth Every 6 (Six) Hours As Needed for Mild Pain.      Bacillus Coagulans-Inulin (Probiotic) 1-250 BILLION-MG capsule Take  by mouth.      Calcium Carbonate-Vitamin D (CALCIUM PLUS VITAMIN D PO) Take 1,200 mg by mouth Daily.      Cyanocobalamin (HM SUPER VITAMIN B12 PO) Take  by mouth Daily.      Multiple Vitamins-Minerals (Emergen-C Immune) pack Take  by mouth As Needed.      NON FORMULARY Take 6,900 mg by mouth 3 " times a day. PNEUMOTROPHIN pmg       No current facility-administered medications for this visit.        Allergies:   No Known Allergies     Past Surgical History:  Past Surgical History:   Procedure Laterality Date    CATARACT EXTRACTION Bilateral     OCTOBER AND DECEMBER    CHOLECYSTECTOMY      TONSILLECTOMY         Social History:  Social History     Socioeconomic History    Marital status:    Tobacco Use    Smoking status: Never    Smokeless tobacco: Never   Vaping Use    Vaping status: Never Used   Substance and Sexual Activity    Alcohol use: No    Drug use: No    Sexual activity: Defer       Family History:  Family History   Problem Relation Age of Onset    Heart disease Mother     Heart attack Mother     Heart attack Father     Heart disease Maternal Uncle     Breast cancer Neg Hx        Review of Systems:  Review of Systems   Constitutional:  Positive for fatigue. Negative for chills, fever and unexpected weight change.   HENT:  Positive for sinus pressure. Negative for congestion.    Respiratory:  Negative for chest tightness and shortness of breath.    Cardiovascular:  Negative for chest pain.   Gastrointestinal:  Positive for abdominal pain and constipation. Negative for diarrhea, nausea and vomiting.   Genitourinary:  Positive for flank pain, frequency and urgency. Negative for difficulty urinating, dysuria and pelvic pain.   Musculoskeletal:  Positive for back pain. Negative for neck pain.   Skin:  Negative for rash.   Allergic/Immunologic: Negative for food allergies.   Neurological:  Positive for headaches. Negative for dizziness.   Hematological:  Bruises/bleeds easily.   Psychiatric/Behavioral:  Negative for confusion and suicidal ideas. The patient is not nervous/anxious.        Physical Exam:  Physical Exam  Constitutional:       General: She is not in acute distress.     Appearance: Normal appearance.   HENT:      Head: Normocephalic and atraumatic.      Nose: Nose normal.       Mouth/Throat:      Mouth: Mucous membranes are moist.   Eyes:      Conjunctiva/sclera: Conjunctivae normal.   Cardiovascular:      Rate and Rhythm: Normal rate and regular rhythm.      Pulses: Normal pulses.      Heart sounds: Normal heart sounds.   Pulmonary:      Effort: Pulmonary effort is normal.      Breath sounds: Normal breath sounds.   Abdominal:      General: Bowel sounds are normal.      Palpations: Abdomen is soft.   Musculoskeletal:         General: Normal range of motion.      Cervical back: Normal range of motion.   Skin:     General: Skin is warm.   Neurological:      General: No focal deficit present.      Mental Status: She is alert and oriented to person, place, and time.   Psychiatric:         Mood and Affect: Mood normal.         Behavior: Behavior normal.         Thought Content: Thought content normal.         Judgment: Judgment normal.             Recent Image (CT and/or KUB):   CT Abdomen and Pelvis: No results found for this or any previous visit.     CT Stone Protocol: No results found for this or any previous visit.     KUB: No results found for this or any previous visit.       Labs:  Brief Urine Lab Results  (Last result in the past 365 days)        Color   Clarity   Blood   Leuk Est   Nitrite   Protein   CREAT   Urine HCG        12/17/24 1046 Yellow   Clear   2+   Large (3+)   Negative   Negative                 Office Visit on 12/17/2024   Component Date Value Ref Range Status    Color 12/17/2024 Yellow  Yellow, Straw, Dark Yellow, Linda Final    Clarity, UA 12/17/2024 Clear  Clear Final    Specific Gravity  12/17/2024 1.020  1.005 - 1.030 Final    pH, Urine 12/17/2024 6.0  5.0 - 8.0 Final    Leukocytes 12/17/2024 Large (3+) (A)  Negative Final    Nitrite, UA 12/17/2024 Negative  Negative Final    Protein, POC 12/17/2024 Negative  Negative mg/dL Final    Glucose, UA 12/17/2024 Negative  Negative mg/dL Final    Ketones, UA 12/17/2024 Negative  Negative Final    Urobilinogen, UA  12/17/2024 Normal  Normal, 0.2 E.U./dL Final    Bilirubin 12/17/2024 Negative  Negative Final    Blood, UA 12/17/2024 2+ (A)  Negative Final    Lot Number 12/17/2024 [id number]   Final    Expiration Date 12/17/2024 10/30/2025   Final        Procedure: None  Procedures     I have reviewed and agree with the above PMH, PSH, FMH, social history, medications, allergies, and labs.     Assessment/Plan:   Problem List Items Addressed This Visit       Microscopic hematuria - Primary     Other Visit Diagnoses       Frequency of micturition        Relevant Orders    POC Urinalysis Dipstick, Automated (Completed)    Urine Culture - Urine, Urine, Clean Catch            Health Maintenance:   Health Maintenance Due   Topic Date Due    LIPID PANEL  Never done    COLORECTAL CANCER SCREENING  Never done    TDAP/TD VACCINES (1 - Tdap) Never done    ZOSTER VACCINE (1 of 2) Never done    Pneumococcal Vaccine 65+ (1 of 1 - PCV) Never done    RSV Vaccine - Adults (1 - 1-dose 75+ series) Never done    ANNUAL WELLNESS VISIT  Never done    BMI FOLLOWUP  04/12/2022    DXA SCAN  05/13/2024    INFLUENZA VACCINE  Never done    COVID-19 Vaccine (1 - 2024-25 season) Never done        Smoking Counseling: Never smoked.  Never used smokeless tobacco.  Counseling given.    Urine Incontinence: Patient reports that she is not currently experiencing any symptoms of urinary incontinence.    Patient was given instructions and counseling regarding her condition or for health maintenance advice. Please see specific information pulled into the AVS if appropriate.    Patient Education:   Microscopic hematuria -patient continues to have microscopic hematuria on urine analysis with concerns of possible infection with 3+ leukocytes.  I will send this off for culture however patient's last urine culture showed no growth. We will hold off on starting an antibiotics. I will send her urine off for culture and call with results once available. Kidney function  remains stable so repeat CT not recommended at this time. Did discuss use of repeat ultrasound given hydronephrosis however patient is wishing to precede forward with observation. Did discuss with the patient the use of cystoscopy for lower tract investigation however she declined at this time. We will see her back in 6 months or sooner if needed. She verbalized understanding.    Visit Diagnoses:    ICD-10-CM ICD-9-CM   1. Microscopic hematuria  R31.29 599.72   2. Frequency of micturition  R35.0 788.41     A total of 20 minutes were spent coordinating this patient’s care in clinic today; 12 minutes of which were face-to-face with the patient, reviewing medical history and counseling on the current treatment and followup plan.  All questions were answered to patient's satisfaction.    Meds Ordered During Visit:  No orders of the defined types were placed in this encounter.      Follow Up Appointment: 3 months  No follow-ups on file.      This document has been electronically signed by John Abad PA-C   December 17, 2024 13:11 EST    Part of this note may be an electronic transcription/translation of spoken language to printed text using the Dragon Dictation System.

## 2024-12-19 ENCOUNTER — TELEPHONE (OUTPATIENT)
Dept: UROLOGY | Facility: CLINIC | Age: 84
End: 2024-12-19
Payer: MEDICARE

## 2024-12-19 LAB — BACTERIA SPEC AEROBE CULT: NO GROWTH

## 2024-12-19 NOTE — TELEPHONE ENCOUNTER
Called patient advised her urine culture showed no growth.  Recommend to keep follow-up for microscopic hematuria return to the clinic sooner if needed.  She verbalized understanding.

## 2025-01-01 ENCOUNTER — APPOINTMENT (OUTPATIENT)
Dept: GENERAL RADIOLOGY | Facility: HOSPITAL | Age: 85
DRG: 250 | End: 2025-01-01
Payer: MEDICARE

## 2025-01-01 ENCOUNTER — HOSPITAL ENCOUNTER (INPATIENT)
Facility: HOSPITAL | Age: 85
LOS: 1 days | DRG: 250 | End: 2025-03-23
Admitting: INTERNAL MEDICINE
Payer: MEDICARE

## 2025-01-01 ENCOUNTER — APPOINTMENT (OUTPATIENT)
Dept: CARDIOLOGY | Facility: HOSPITAL | Age: 85
DRG: 250 | End: 2025-01-01
Payer: MEDICARE

## 2025-01-01 VITALS
BODY MASS INDEX: 23.08 KG/M2 | DIASTOLIC BLOOD PRESSURE: 88 MMHG | HEART RATE: 140 BPM | SYSTOLIC BLOOD PRESSURE: 101 MMHG | HEIGHT: 62 IN | WEIGHT: 125.44 LBS | RESPIRATION RATE: 16 BRPM | TEMPERATURE: 97 F | OXYGEN SATURATION: 84 %

## 2025-01-01 DIAGNOSIS — I21.3 ST ELEVATION MYOCARDIAL INFARCTION (STEMI), UNSPECIFIED ARTERY: Primary | ICD-10-CM

## 2025-01-01 LAB
ALBUMIN SERPL-MCNC: 3.8 G/DL (ref 3.5–5.2)
ALBUMIN/GLOB SERPL: 1 G/DL
ALP SERPL-CCNC: 157 U/L (ref 39–117)
ALT SERPL W P-5'-P-CCNC: 9 U/L (ref 1–33)
ANION GAP SERPL CALCULATED.3IONS-SCNC: 14.7 MMOL/L (ref 5–15)
AST SERPL-CCNC: 23 U/L (ref 1–32)
BASOPHILS # BLD AUTO: 0.11 10*3/MM3 (ref 0–0.2)
BASOPHILS NFR BLD AUTO: 1 % (ref 0–1.5)
BILIRUB SERPL-MCNC: 0.6 MG/DL (ref 0–1.2)
BUN SERPL-MCNC: 8 MG/DL (ref 8–23)
BUN/CREAT SERPL: 7.8 (ref 7–25)
CALCIUM SPEC-SCNC: 9.8 MG/DL (ref 8.6–10.5)
CHLORIDE SERPL-SCNC: 100 MMOL/L (ref 98–107)
CO2 SERPL-SCNC: 23.3 MMOL/L (ref 22–29)
CREAT SERPL-MCNC: 1.02 MG/DL (ref 0.57–1)
DEPRECATED RDW RBC AUTO: 46.4 FL (ref 37–54)
EGFRCR SERPLBLD CKD-EPI 2021: 54.7 ML/MIN/1.73
EOSINOPHIL # BLD AUTO: 0.32 10*3/MM3 (ref 0–0.4)
EOSINOPHIL NFR BLD AUTO: 3 % (ref 0.3–6.2)
ERYTHROCYTE [DISTWIDTH] IN BLOOD BY AUTOMATED COUNT: 13.2 % (ref 12.3–15.4)
GEN 5 1HR TROPONIN T REFLEX: 1052 NG/L
GLOBULIN UR ELPH-MCNC: 3.8 GM/DL
GLUCOSE BLDC GLUCOMTR-MCNC: 172 MG/DL (ref 70–130)
GLUCOSE SERPL-MCNC: 132 MG/DL (ref 65–99)
HCT VFR BLD AUTO: 45.1 % (ref 34–46.6)
HGB BLD-MCNC: 14.6 G/DL (ref 12–15.9)
HOLD SPECIMEN: NORMAL
HOLD SPECIMEN: NORMAL
IMM GRANULOCYTES # BLD AUTO: 0.03 10*3/MM3 (ref 0–0.05)
IMM GRANULOCYTES NFR BLD AUTO: 0.3 % (ref 0–0.5)
LYMPHOCYTES # BLD AUTO: 3.76 10*3/MM3 (ref 0.7–3.1)
LYMPHOCYTES NFR BLD AUTO: 34.8 % (ref 19.6–45.3)
MCH RBC QN AUTO: 30.9 PG (ref 26.6–33)
MCHC RBC AUTO-ENTMCNC: 32.4 G/DL (ref 31.5–35.7)
MCV RBC AUTO: 95.6 FL (ref 79–97)
MONOCYTES # BLD AUTO: 0.7 10*3/MM3 (ref 0.1–0.9)
MONOCYTES NFR BLD AUTO: 6.5 % (ref 5–12)
NEUTROPHILS NFR BLD AUTO: 5.89 10*3/MM3 (ref 1.7–7)
NEUTROPHILS NFR BLD AUTO: 54.4 % (ref 42.7–76)
NRBC BLD AUTO-RTO: 0 /100 WBC (ref 0–0.2)
PLATELET # BLD AUTO: 386 10*3/MM3 (ref 140–450)
PMV BLD AUTO: 9.7 FL (ref 6–12)
POTASSIUM SERPL-SCNC: 3.8 MMOL/L (ref 3.5–5.2)
PROT SERPL-MCNC: 7.6 G/DL (ref 6–8.5)
QT INTERVAL: 276 MS
QT INTERVAL: 318 MS
QTC INTERVAL: 437 MS
QTC INTERVAL: 442 MS
RBC # BLD AUTO: 4.72 10*6/MM3 (ref 3.77–5.28)
SODIUM SERPL-SCNC: 138 MMOL/L (ref 136–145)
TROPONIN T % DELTA: 1848
TROPONIN T NUMERIC DELTA: 998 NG/L
TROPONIN T SERPL HS-MCNC: 54 NG/L
WBC NRBC COR # BLD AUTO: 10.81 10*3/MM3 (ref 3.4–10.8)
WHOLE BLOOD HOLD COAG: NORMAL
WHOLE BLOOD HOLD SPECIMEN: NORMAL

## 2025-01-01 PROCEDURE — 02703ZZ DILATION OF CORONARY ARTERY, ONE ARTERY, PERCUTANEOUS APPROACH: ICD-10-PCS | Performed by: INTERNAL MEDICINE

## 2025-01-01 PROCEDURE — 94002 VENT MGMT INPAT INIT DAY: CPT

## 2025-01-01 PROCEDURE — 25010000002 EPTIFIBATIDE PER 5 MG: Performed by: INTERNAL MEDICINE

## 2025-01-01 PROCEDURE — 25010000002 ATROPINE SULFATE 0.1 MG/ML: Performed by: INTERNAL MEDICINE

## 2025-01-01 PROCEDURE — B2151ZZ FLUOROSCOPY OF LEFT HEART USING LOW OSMOLAR CONTRAST: ICD-10-PCS | Performed by: INTERNAL MEDICINE

## 2025-01-01 PROCEDURE — C1894 INTRO/SHEATH, NON-LASER: HCPCS | Performed by: INTERNAL MEDICINE

## 2025-01-01 PROCEDURE — 25510000001 IOPAMIDOL PER 1 ML: Performed by: INTERNAL MEDICINE

## 2025-01-01 PROCEDURE — C1725 CATH, TRANSLUMIN NON-LASER: HCPCS | Performed by: INTERNAL MEDICINE

## 2025-01-01 PROCEDURE — 96374 THER/PROPH/DIAG INJ IV PUSH: CPT

## 2025-01-01 PROCEDURE — 3E033PZ INTRODUCTION OF PLATELET INHIBITOR INTO PERIPHERAL VEIN, PERCUTANEOUS APPROACH: ICD-10-PCS | Performed by: INTERNAL MEDICINE

## 2025-01-01 PROCEDURE — 25810000003 SODIUM CHLORIDE 0.9 % SOLUTION: Performed by: INTERNAL MEDICINE

## 2025-01-01 PROCEDURE — 93005 ELECTROCARDIOGRAM TRACING: CPT

## 2025-01-01 PROCEDURE — 80053 COMPREHEN METABOLIC PANEL: CPT

## 2025-01-01 PROCEDURE — 0W9D3ZZ DRAINAGE OF PERICARDIAL CAVITY, PERCUTANEOUS APPROACH: ICD-10-PCS | Performed by: INTERNAL MEDICINE

## 2025-01-01 PROCEDURE — 02C03ZZ EXTIRPATION OF MATTER FROM CORONARY ARTERY, ONE ARTERY, PERCUTANEOUS APPROACH: ICD-10-PCS | Performed by: INTERNAL MEDICINE

## 2025-01-01 PROCEDURE — C1769 GUIDE WIRE: HCPCS | Performed by: INTERNAL MEDICINE

## 2025-01-01 PROCEDURE — 92950 HEART/LUNG RESUSCITATION CPR: CPT

## 2025-01-01 PROCEDURE — 25010000002 AMIODARONE IN DEXTROSE 5% 150-4.21 MG/100ML-% SOLUTION: Performed by: INTERNAL MEDICINE

## 2025-01-01 PROCEDURE — 92941 PRQ TRLML REVSC TOT OCCL AMI: CPT | Performed by: INTERNAL MEDICINE

## 2025-01-01 PROCEDURE — 93010 ELECTROCARDIOGRAM REPORT: CPT | Performed by: SPECIALIST

## 2025-01-01 PROCEDURE — B2111ZZ FLUOROSCOPY OF MULTIPLE CORONARY ARTERIES USING LOW OSMOLAR CONTRAST: ICD-10-PCS | Performed by: INTERNAL MEDICINE

## 2025-01-01 PROCEDURE — 25010000002 EPTIFIBATIDE 20 MG/10ML SOLUTION: Performed by: INTERNAL MEDICINE

## 2025-01-01 PROCEDURE — 25010000002 HEPARIN (PORCINE) PER 1000 UNITS

## 2025-01-01 PROCEDURE — C1760 CLOSURE DEV, VASC: HCPCS | Performed by: INTERNAL MEDICINE

## 2025-01-01 PROCEDURE — 93458 L HRT ARTERY/VENTRICLE ANGIO: CPT | Performed by: INTERNAL MEDICINE

## 2025-01-01 PROCEDURE — 99223 1ST HOSP IP/OBS HIGH 75: CPT | Performed by: INTERNAL MEDICINE

## 2025-01-01 PROCEDURE — 25010000002 EPINEPHRINE 1 MG/ML SOLUTION: Performed by: INTERNAL MEDICINE

## 2025-01-01 PROCEDURE — 31500 INSERT EMERGENCY AIRWAY: CPT | Performed by: STUDENT IN AN ORGANIZED HEALTH CARE EDUCATION/TRAINING PROGRAM

## 2025-01-01 PROCEDURE — C9606 PERC D-E COR REVASC W AMI S: HCPCS | Performed by: INTERNAL MEDICINE

## 2025-01-01 PROCEDURE — 96375 TX/PRO/DX INJ NEW DRUG ADDON: CPT

## 2025-01-01 PROCEDURE — 25010000002 AMIODARONE IN DEXTROSE 5% 360-4.14 MG/200ML-% SOLUTION: Performed by: INTERNAL MEDICINE

## 2025-01-01 PROCEDURE — 25010000002 LIDOCAINE 2% SOLUTION: Performed by: INTERNAL MEDICINE

## 2025-01-01 PROCEDURE — 84484 ASSAY OF TROPONIN QUANT: CPT

## 2025-01-01 PROCEDURE — 94799 UNLISTED PULMONARY SVC/PX: CPT

## 2025-01-01 PROCEDURE — 25010000002 HEPARIN (PORCINE) PER 1000 UNITS: Performed by: INTERNAL MEDICINE

## 2025-01-01 PROCEDURE — C1757 CATH, THROMBECTOMY/EMBOLECT: HCPCS | Performed by: INTERNAL MEDICINE

## 2025-01-01 PROCEDURE — 99285 EMERGENCY DEPT VISIT HI MDM: CPT

## 2025-01-01 PROCEDURE — 4A023N7 MEASUREMENT OF CARDIAC SAMPLING AND PRESSURE, LEFT HEART, PERCUTANEOUS APPROACH: ICD-10-PCS | Performed by: INTERNAL MEDICINE

## 2025-01-01 PROCEDURE — 93005 ELECTROCARDIOGRAM TRACING: CPT | Performed by: INTERNAL MEDICINE

## 2025-01-01 PROCEDURE — 5A1935Z RESPIRATORY VENTILATION, LESS THAN 24 CONSECUTIVE HOURS: ICD-10-PCS | Performed by: STUDENT IN AN ORGANIZED HEALTH CARE EDUCATION/TRAINING PROGRAM

## 2025-01-01 PROCEDURE — 5A12012 PERFORMANCE OF CARDIAC OUTPUT, SINGLE, MANUAL: ICD-10-PCS | Performed by: INTERNAL MEDICINE

## 2025-01-01 PROCEDURE — 85025 COMPLETE CBC W/AUTO DIFF WBC: CPT

## 2025-01-01 PROCEDURE — 25010000002 MAGNESIUM SULFATE PER 500 MG OF MAGNESIUM: Performed by: INTERNAL MEDICINE

## 2025-01-01 PROCEDURE — 0BH17EZ INSERTION OF ENDOTRACHEAL AIRWAY INTO TRACHEA, VIA NATURAL OR ARTIFICIAL OPENING: ICD-10-PCS | Performed by: STUDENT IN AN ORGANIZED HEALTH CARE EDUCATION/TRAINING PROGRAM

## 2025-01-01 PROCEDURE — 84484 ASSAY OF TROPONIN QUANT: CPT | Performed by: INTERNAL MEDICINE

## 2025-01-01 PROCEDURE — C1887 CATHETER, GUIDING: HCPCS | Performed by: INTERNAL MEDICINE

## 2025-01-01 PROCEDURE — 82948 REAGENT STRIP/BLOOD GLUCOSE: CPT

## 2025-01-01 PROCEDURE — 25010000002 EPINEPHRINE 1 MG/10ML SOLUTION PREFILLED SYRINGE: Performed by: INTERNAL MEDICINE

## 2025-01-01 PROCEDURE — 25010000002 ATROPINE SULFATE 0.1 MG/ML

## 2025-01-01 PROCEDURE — 94761 N-INVAS EAR/PLS OXIMETRY MLT: CPT

## 2025-01-01 RX ORDER — LIDOCAINE HYDROCHLORIDE 20 MG/ML
INJECTION, SOLUTION INFILTRATION; PERINEURAL
Status: DISCONTINUED | OUTPATIENT
Start: 2025-01-01 | End: 2025-01-01 | Stop reason: HOSPADM

## 2025-01-01 RX ORDER — HEPARIN SODIUM 1000 [USP'U]/ML
INJECTION, SOLUTION INTRAVENOUS; SUBCUTANEOUS
Status: DISCONTINUED | OUTPATIENT
Start: 2025-01-01 | End: 2025-01-01 | Stop reason: HOSPADM

## 2025-01-01 RX ORDER — IOPAMIDOL 755 MG/ML
INJECTION, SOLUTION INTRAVASCULAR
Status: DISCONTINUED | OUTPATIENT
Start: 2025-01-01 | End: 2025-01-01 | Stop reason: HOSPADM

## 2025-01-01 RX ORDER — METOPROLOL TARTRATE 1 MG/ML
2.5 INJECTION, SOLUTION INTRAVENOUS ONCE
Status: COMPLETED | OUTPATIENT
Start: 2025-01-01 | End: 2025-01-01

## 2025-01-01 RX ORDER — HEPARIN SODIUM 5000 [USP'U]/ML
INJECTION, SOLUTION INTRAVENOUS; SUBCUTANEOUS
Status: COMPLETED
Start: 2025-01-01 | End: 2025-01-01

## 2025-01-01 RX ORDER — NOREPINEPHRINE BITARTRATE 0.03 MG/ML
INJECTION, SOLUTION INTRAVENOUS
Status: COMPLETED | OUTPATIENT
Start: 2025-01-01 | End: 2025-01-01

## 2025-01-01 RX ORDER — EPTIFIBATIDE 20 MG/10ML
INJECTION INTRAVENOUS
Status: DISCONTINUED | OUTPATIENT
Start: 2025-01-01 | End: 2025-01-01 | Stop reason: HOSPADM

## 2025-01-01 RX ORDER — INDOMETHACIN 25 MG/1
CAPSULE ORAL
Status: COMPLETED | OUTPATIENT
Start: 2025-01-01 | End: 2025-01-01

## 2025-01-01 RX ORDER — ASPIRIN 81 MG/1
81 TABLET ORAL DAILY
Status: DISCONTINUED | OUTPATIENT
Start: 2025-03-24 | End: 2025-01-01 | Stop reason: HOSPADM

## 2025-01-01 RX ORDER — MAGNESIUM SULFATE HEPTAHYDRATE 500 MG/ML
INJECTION, SOLUTION INTRAMUSCULAR; INTRAVENOUS
Status: COMPLETED | OUTPATIENT
Start: 2025-01-01 | End: 2025-01-01

## 2025-01-01 RX ORDER — PHENYLEPHRINE HCL IN 0.9% NACL 1 MG/10 ML
SYRINGE (ML) INTRAVENOUS
Status: COMPLETED | OUTPATIENT
Start: 2025-01-01 | End: 2025-01-01

## 2025-01-01 RX ORDER — NOREPINEPHRINE BITARTRATE 0.03 MG/ML
.02-.3 INJECTION, SOLUTION INTRAVENOUS
Status: DISCONTINUED | OUTPATIENT
Start: 2025-01-01 | End: 2025-01-01 | Stop reason: HOSPADM

## 2025-01-01 RX ORDER — HEPARIN SODIUM 5000 [USP'U]/ML
60 INJECTION, SOLUTION INTRAVENOUS; SUBCUTANEOUS ONCE
Status: COMPLETED | OUTPATIENT
Start: 2025-01-01 | End: 2025-01-01

## 2025-01-01 RX ORDER — NOREPINEPHRINE BITARTRATE 0.03 MG/ML
INJECTION, SOLUTION INTRAVENOUS
Status: COMPLETED
Start: 2025-01-01 | End: 2025-01-01

## 2025-01-01 RX ORDER — SODIUM CHLORIDE 9 MG/ML
INJECTION, SOLUTION INTRAVENOUS
Status: COMPLETED | OUTPATIENT
Start: 2025-01-01 | End: 2025-01-01

## 2025-01-01 RX ORDER — METOPROLOL TARTRATE 1 MG/ML
INJECTION, SOLUTION INTRAVENOUS
Status: COMPLETED
Start: 2025-01-01 | End: 2025-01-01

## 2025-01-01 RX ORDER — EPTIFIBATIDE 0.75 MG/ML
1 INJECTION, SOLUTION INTRAVENOUS CONTINUOUS
Status: DISCONTINUED | OUTPATIENT
Start: 2025-01-01 | End: 2025-01-01 | Stop reason: HOSPADM

## 2025-01-01 RX ORDER — ASPIRIN 81 MG/1
324 TABLET, CHEWABLE ORAL ONCE
Status: DISCONTINUED | OUTPATIENT
Start: 2025-01-01 | End: 2025-01-01 | Stop reason: HOSPADM

## 2025-01-01 RX ORDER — ATORVASTATIN CALCIUM 40 MG/1
40 TABLET, FILM COATED ORAL NIGHTLY
Status: DISCONTINUED | OUTPATIENT
Start: 2025-01-01 | End: 2025-01-01 | Stop reason: HOSPADM

## 2025-01-01 RX ORDER — SODIUM CHLORIDE 9 MG/ML
3 INJECTION, SOLUTION INTRAVENOUS CONTINUOUS
Status: DISCONTINUED | OUTPATIENT
Start: 2025-01-01 | End: 2025-01-01 | Stop reason: HOSPADM

## 2025-01-01 RX ORDER — METOPROLOL TARTRATE 25 MG/1
12.5 TABLET, FILM COATED ORAL EVERY 12 HOURS SCHEDULED
Status: DISCONTINUED | OUTPATIENT
Start: 2025-01-01 | End: 2025-01-01 | Stop reason: HOSPADM

## 2025-01-01 RX ORDER — NITROGLYCERIN 0.4 MG/1
0.4 TABLET SUBLINGUAL
Status: DISCONTINUED | OUTPATIENT
Start: 2025-01-01 | End: 2025-01-01 | Stop reason: HOSPADM

## 2025-01-01 RX ORDER — METOPROLOL TARTRATE 1 MG/ML
5 INJECTION, SOLUTION INTRAVENOUS ONCE
Status: DISCONTINUED | OUTPATIENT
Start: 2025-01-01 | End: 2025-01-01

## 2025-01-01 RX ORDER — PROPOFOL 10 MG/ML
VIAL (ML) INTRAVENOUS
Status: DISCONTINUED
Start: 2025-01-01 | End: 2025-01-01 | Stop reason: HOSPADM

## 2025-01-01 RX ORDER — EPTIFIBATIDE 0.75 MG/ML
INJECTION, SOLUTION INTRAVENOUS
Status: COMPLETED | OUTPATIENT
Start: 2025-01-01 | End: 2025-01-01

## 2025-01-01 RX ORDER — ETOMIDATE 2 MG/ML
INJECTION INTRAVENOUS
Status: COMPLETED | OUTPATIENT
Start: 2025-01-01 | End: 2025-01-01

## 2025-01-01 RX ORDER — ACETAMINOPHEN 325 MG/1
650 TABLET ORAL EVERY 4 HOURS PRN
Status: DISCONTINUED | OUTPATIENT
Start: 2025-01-01 | End: 2025-01-01 | Stop reason: HOSPADM

## 2025-01-01 RX ORDER — PHENYLEPHRINE HCL IN 0.9% NACL 1 MG/10 ML
SYRINGE (ML) INTRAVENOUS
Status: COMPLETED
Start: 2025-01-01 | End: 2025-01-01

## 2025-01-01 RX ORDER — SODIUM CHLORIDE 0.9 % (FLUSH) 0.9 %
10 SYRINGE (ML) INJECTION AS NEEDED
Status: DISCONTINUED | OUTPATIENT
Start: 2025-01-01 | End: 2025-01-01 | Stop reason: HOSPADM

## 2025-01-01 RX ADMIN — ATROPINE SULFATE: 0.1 INJECTION PARENTERAL at 11:50

## 2025-01-01 RX ADMIN — Medication 100 MCG: at 11:15

## 2025-01-01 RX ADMIN — EPINEPHRINE 1 MG: 0.1 INJECTION INTRAVENOUS at 11:08

## 2025-01-01 RX ADMIN — METOPROLOL TARTRATE 2.5 MG: 1 INJECTION, SOLUTION INTRAVENOUS at 07:27

## 2025-01-01 RX ADMIN — ATROPINE SULFATE 0.5 MG: 0.1 INJECTION PARENTERAL at 11:24

## 2025-01-01 RX ADMIN — Medication 0.03 MCG/KG/MIN: at 18:45

## 2025-01-01 RX ADMIN — SODIUM BICARBONATE 50 MEQ: 84 INJECTION INTRAVENOUS at 11:29

## 2025-01-01 RX ADMIN — AMIODARONE HYDROCHLORIDE 1 MG/MIN: 1.8 INJECTION, SOLUTION INTRAVENOUS at 09:36

## 2025-01-01 RX ADMIN — SODIUM CHLORIDE 1000 ML: 9 INJECTION, SOLUTION INTRAVENOUS at 11:27

## 2025-01-01 RX ADMIN — EPINEPHRINE 1 MG: 0.1 INJECTION INTRAVENOUS at 11:33

## 2025-01-01 RX ADMIN — ATROPINE SULFATE 0.5 MG: 0.1 INJECTION PARENTERAL at 11:50

## 2025-01-01 RX ADMIN — ETOMIDATE 10 MG: 2 INJECTION, SOLUTION INTRAVENOUS at 11:14

## 2025-01-01 RX ADMIN — Medication 0.02 MCG/KG/MIN: at 11:41

## 2025-01-01 RX ADMIN — HEPARIN SODIUM 3800 UNITS: 5000 INJECTION INTRAVENOUS; SUBCUTANEOUS at 07:07

## 2025-01-01 RX ADMIN — MAGNESIUM SULFATE HEPTAHYDRATE 1 G: 500 INJECTION, SOLUTION INTRAMUSCULAR; INTRAVENOUS at 18:47

## 2025-01-01 RX ADMIN — EPINEPHRINE 1 MG: 0.1 INJECTION INTRAVENOUS at 11:39

## 2025-01-01 RX ADMIN — AMIODARONE HYDROCHLORIDE 150 MG: 1.5 INJECTION, SOLUTION INTRAVENOUS at 09:23

## 2025-01-01 RX ADMIN — ATROPINE SULFATE 0.5 MG: 0.1 INJECTION PARENTERAL at 11:21

## 2025-01-01 RX ADMIN — EPINEPHRINE 1 MG: 0.1 INJECTION INTRAVENOUS at 11:30

## 2025-01-01 RX ADMIN — TICAGRELOR 180 MG: 90 TABLET ORAL at 07:07

## 2025-01-01 RX ADMIN — Medication: at 11:15

## 2025-01-01 RX ADMIN — HEPARIN SODIUM 3800 UNITS: 5000 INJECTION, SOLUTION INTRAVENOUS; SUBCUTANEOUS at 07:07

## 2025-01-01 RX ADMIN — SODIUM BICARBONATE 50 MEQ: 84 INJECTION INTRAVENOUS at 11:38

## 2025-01-01 RX ADMIN — EPINEPHRINE 1 MG: 0.1 INJECTION INTRAVENOUS at 11:36

## 2025-01-01 RX ADMIN — NOREPINEPHRINE BITARTRATE 0.15 MCG/KG/MIN: 0.03 INJECTION, SOLUTION INTRAVENOUS at 11:41

## 2025-01-01 RX ADMIN — ATROPINE SULFATE 1 MG: 0.1 INJECTION PARENTERAL at 11:36

## 2025-01-01 RX ADMIN — SODIUM BICARBONATE 50 MEQ: 84 INJECTION INTRAVENOUS at 11:36

## 2025-01-01 RX ADMIN — Medication 0.15 MCG/KG/MIN: at 11:41

## 2025-01-01 RX ADMIN — Medication 0.3 MCG/KG/MIN: at 11:27

## 2025-01-01 RX ADMIN — ATROPINE SULFATE 1 MG: 0.1 INJECTION PARENTERAL at 11:25

## 2025-03-23 PROBLEM — I21.3 STEMI (ST ELEVATION MYOCARDIAL INFARCTION): Status: ACTIVE | Noted: 2025-01-01

## 2025-03-23 NOTE — Clinical Note
First balloon inflation max pressure = 5 alvarez. First balloon inflation duration = 27 seconds. Second inflation of balloon - Max pressure = 4 alvarez. 2nd Inflation of balloon - Duration = 25 seconds.

## 2025-03-23 NOTE — PROGRESS NOTES
Responded to CODE BLUE.  Patien became unresponsive suddenly and went into a PEA rhythm.  ACLS protocol was initiated.  She was not hypotensive before the cardiac arrest.  After 30 minutes of CPR patient had ROSC.  She was intubated during the procedure.  She subsequently became bradycardic and had a cardiac arrest again.  CPR was initiated and after 10 to 15 minutes there was ROSC.  She was on epinephrine drip and Levophed.  She received several rounds of epinephrine and atropine.  She also received bicarb.  On bedside ultrasound there appeared to be a pericardial effusion.  Bedside emergent pericardiocentesis was attempted during the code.  At this point the differential diagnosis was PEA arrest due to pulmonary embolism or CVA.  Unlikely to be secondary to pericardial effusion as the effusion was small and patient did not become hypotensive before the cardiac arrest.  I had multiple discussions with the patient's family and they made her DNR.  No further CPR if patient were to go into cardiac arrest.  Patient will be maintained on current medications.

## 2025-03-23 NOTE — ED NOTES
Pt belongings including clothes/purse/necklace and placed belongings bag and given to sweta Wynne per pt request.

## 2025-03-23 NOTE — NURSING NOTE
1105 Pt not responding, without pulse- code blue called, compressions began. ROSC obtained @ 1110. Pt intubated at 1115. CPR began again @ 1128 and ROSC was obtained the 2nd time at 1142. Cardio and Attending at bedside. Pt made a DNR. Pt made comfort and terminally extubated and without pulse. MD called time of death at 1216.

## 2025-03-23 NOTE — DISCHARGE SUMMARY
[facility] Medicine Services  DISCHARGE SUMMARY    Date of Admission: 3/23/2025    Date of Discharge:  3/23/2025    PCP: Bridget Segal MD    Discharging Provider: Diana Gupta MD.    Admission Diagnosis:   Please see admission H&P    Discharge Diagnosis:   PEA cardiac arrest  ST elevation MI  Atrial fibrillation not on anticoagulation      Procedures Performed:  1. Selective right and left coronary Angiogram  2. Left heart catheterization    3. Left ventriculography  4. Aspiration thrombectomy using CAT Rx penumbra catheter.  5. PTA of the distal LAD with a 2.0 x 15 mm compliant balloon.  6. Right femoral artery access-perclosed.      Consults:   Consults       Date and Time Order Name Status Description    3/23/2025  8:44 AM Inpatient Hospitalist Consult              HPI     History of Present Illness:  Juan José Meza is a 84 y.o. female who presented to hospital with complaints of chest pain this morning.  EKG showed inferior ST elevation MI.  She was emergently taken to the Cath Lab.       Hospital Course     Hospital Course  Juan José Meza was admitted as outlined in above HPI.     Patient underwent coronary angiogram and found to have a right dominant circulation.  The distal LAD was noted to be occluded.  Left ventriculogram showed moderate to severely reduced ejection fraction.  She underwent aspiration thrombectomy and PTA of the distal LAD with some flow although the most distal part remained persistently occluded.  It appeared to be embolic.  She was started on Integrilin drip.  She also was started on amiodarone drip for her A-fib RVR.  Patient remained stable throughout the procedure in the Cath Lab.  After she was transferred to the CCU MANUEL BENITEZ was called where patient suddenly lost consciousness and went into PEA cardiac arrest.  ACLS protocol was started.  After about 30 minutes she had ROSC.  A bedside ultrasound showed a pericardial effusion.  There was a  "concern if patient developed pericardial effusion with possible tamponade while undergoing CPR.  Attempted bedside emergent pericardiocentesis during the code.  Patient again became bradycardic and went into cardiac arrest.  Patient was intubated during the first cardiac arrest.  After about 15 minutes we had ROSC.  She was started on epi and Levophed.  After multiple discussions with patient's family she was made DNR.  She subsequently passed away.    Pertinent Laboratory and Radiology Results     Pertinent Test Results:          Results from last 7 days   Lab Units 03/23/25  0728 03/23/25  0707   WBC 10*3/mm3  --  10.81*   HEMOGLOBIN g/dL  --  14.6   HEMATOCRIT %  --  45.1   PLATELETS 10*3/mm3  --  386   MCV fL  --  95.6   SODIUM mmol/L 138  --    POTASSIUM mmol/L 3.8  --    CHLORIDE mmol/L 100  --    CO2 mmol/L 23.3  --    BUN mg/dL 8  --    CREATININE mg/dL 1.02*  --    GLUCOSE mg/dL 132*  --    CALCIUM mg/dL 9.8  --         Results from last 7 days   Lab Units 03/23/25  1038 03/23/25  0728   HSTROP T ng/L 1,052* 54*     Results from last 7 days   Lab Units 03/23/25  0707   WBC 10*3/mm3 10.81*     Results from last 7 days   Lab Units 03/23/25  0728   BILIRUBIN mg/dL 0.6   ALK PHOS U/L 157*   ALT (SGPT) U/L 9   AST (SGOT) U/L 23           Invalid input(s): \"TG\", \"LDLCALC\", \"LDLREALC\"      Brief Urine Lab Results  (Last result in the past 365 days)        Color   Clarity   Blood   Leuk Est   Nitrite   Protein   CREAT   Urine HCG        12/17/24 1046 Yellow   Clear   2+   Large (3+)   Negative   Negative                           Results for orders placed during the hospital encounter of 06/14/21    Adult Transthoracic Echo Complete w/ Color, Spectral and Contrast if necessary per protocol    Interpretation Summary  · Left ventricular diastolic function is consistent with age.  · Left ventricular wall thickness is consistent with mild concentric hypertrophy.  · Left ventricular ejection fraction appears to be " greater than 70%. Left ventricular systolic function is normal.  · Mild aortic valve stenosis is present.  · Estimated right ventricular systolic pressure from tricuspid regurgitation is moderately elevated (45-55 mmHg).            ----------------------------------------------------------------------------------------------------------------------  No radiology results for the last 30 days.      Microbiology Results (last 10 days)       ** No results found for the last 240 hours. **            Labs above have been reviewed on the day of discharge.  Radiology images from prior 30 days were reviewed prior to discharge as incorporated into this document.     Discharge Vitals and Physical Examination       Vital Signs  Temp:  [97 °F (36.1 °C)-98 °F (36.7 °C)] 97 °F (36.1 °C)  Heart Rate:  [] 140  Resp:  [16-24] 16  BP: ()/() 101/88  FiO2 (%):  [100 %] 100 %     PHYSICAL EXAMINATION:   Physical Exam      Discharge Disposition, Discharge Medications, and Discharge Appointments     Discharge Disposition:    home.    Condition on Discharge:      Discharge Medications:     Discharge Medications        ASK your doctor about these medications        Instructions Start Date   acetaminophen 500 MG tablet  Commonly known as: TYLENOL   500 mg, Every 6 Hours PRN      CALCIUM PLUS VITAMIN D PO   1,200 mg, Daily      Emergen-C Immune pack   As Needed      HM SUPER VITAMIN B12 PO   Daily      NON FORMULARY   6,900 mg, 3 times daily      Probiotic 1-250 BILLION-MG capsule   Take  by mouth.               Discharged medication regimen discussed with attending physician prior to discharge.     Discharge Diet:   Not applicable.  Dietary Orders (From admission, onward)       Start     Ordered    25 1036  Diet: Cardiac; Healthy Heart (2-3 Na+); Fluid Consistency: Thin (IDDSI 0)  Diet Effective Now        References:    Diet Order Definitions   Question Answer Comment   Diets: Cardiac    Cardiac Diet:  Healthy Heart (2-3 Na+)    Fluid Consistency: Thin (IDDSI 0)        03/23/25 1035                    Activity at Discharge:  Not applicable.         Discharge Disposition:            Follow-up Appointments:  Your Scheduled Appointments      Apr 08, 2025 11:15 AM  Follow Up with MARCIAL Almazan  [facility] CARDIOLOGY ([place]) [address]  [place] [address]  [phone]   -Bring photo ID, insurance card, and list of medications to appointment  -If testing was completed outside of [facility] then patient must bring images on a disc  -Copay will be collected at time of appointment  -Established patients should arrive 10 minutes prior to appointment         Jun 17, 2025 11:15 AM  Follow Up with John Abad PA-C  [facility] GASTROENTEROLOGY & UROLOGY ([facility]) [address] [facility] [place] [address]  [phone]   Arrive 15 minutes prior to appointment.                        Test Results Pending at Discharge:        The ASCVD Risk score (Gumaro DK, et al., 2019) failed to calculate for the following reasons:    The 2019 ASCVD risk score is only valid for ages 40 to 79    Risk score cannot be calculated because patient has a medical history suggesting prior/existing ASCVD      Siri Gaffney PA-C  Hospital Medicine Team  03/23/25  13:25 EDT      Time: Greater than 30 minutes spent on this discharge.  I spent 30 minutes on this discharge activity which included:  Face-to-face encounter with the patient, discussing plan with attending physician, reviewing the data in the system, coordination of the care with the nursing staff as well as consultations, documentation, and entering orders.

## 2025-03-23 NOTE — H&P
Patient Identification:  Name:  Juan José Meza  Age:  84 y.o.  Sex:  female  :  1940  MRN:  [id number]   Visit Number:  [id number]  Primary Care Physician:  Bridget Segal MD    Chief complaint:   Chest pain  History of presenting illness:      84-year-old pleasant female presented to the ER with complaints of chest pain that started around 6 AM.  Patient was already up.  She said the pain started between the shoulder blades and then radiated to the chest.  No associated shortness of breath, sweating, nausea or vomiting.  In the ER EKG showed inferolateral ST elevation MI.    Patient has a history of atrial fibrillation but could not tolerate Eliquis or Xarelto because of hematuria.  She took 1 dose and then had the bleeding.  She was seen by urology and workup showed mild left renal hydronephrosis without definite obstruction.  Patient had refused cystoscopy workup.    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    ROS: All systems reviewed and negative except as mentioned above.     ---------------------------------------------------------------------------------------------------------------------   Past Medical History:   Diagnosis Date    Chest pain     Dyslipidemia     Environmental allergies     Hypertension      Past Surgical History:   Procedure Laterality Date    CATARACT EXTRACTION Bilateral     OCTOBER AND DECEMBER    CHOLECYSTECTOMY      TONSILLECTOMY       Family History   Problem Relation Age of Onset    Heart disease Mother     Heart attack Mother     Heart attack Father     Heart disease Maternal Uncle     Breast cancer Neg Hx      Social History     Socioeconomic History    Marital status:    Tobacco Use    Smoking status: Never    Smokeless tobacco: Never   Vaping Use    Vaping status: Never Used   Substance and Sexual  Activity    Alcohol use: No    Drug use: No    Sexual activity: Defer     ---------------------------------------------------------------------------------------------------------------------   Allergies:  Patient has no known allergies.  ---------------------------------------------------------------------------------------------------------------------   Prior to Admission Medications       Prescriptions Last Dose Informant Patient Reported? Taking?    acetaminophen (TYLENOL) 500 MG tablet   Yes No    Take 1 tablet by mouth Every 6 (Six) Hours As Needed for Mild Pain.    Bacillus Coagulans-Inulin (Probiotic) 1-250 BILLION-MG capsule   Yes No    Take  by mouth.    Calcium Carbonate-Vitamin D (CALCIUM PLUS VITAMIN D PO)   Yes No    Take 1,200 mg by mouth Daily.    Cyanocobalamin (HM SUPER VITAMIN B12 PO)   Yes No    Take  by mouth Daily.    Multiple Vitamins-Minerals (Emergen-C Immune) pack   Yes No    Take  by mouth As Needed.    NON FORMULARY   Yes No    Take 6,900 mg by mouth 3 times a day. PNEUMOTROPHIN [facility] Scheduled Meds:  aspirin, 324 mg, Oral, Once         ---------------------------------------------------------------------------------------------------------------------   Vital Signs:  Temp:  [98 °F (36.7 °C)] 98 °F (36.7 °C)  Heart Rate:  [135-161] 161  Resp:  [18] 18  BP: (153-193)/(105-109) 153/109      03/23/25  0709   Weight: 63.5 kg (140 lb)     Body mass index is 27.34 kg/m².  ---------------------------------------------------------------------------------------------------------------------   Physical Exam:  Constitutional:  Well-developed and well-nourished.  No respiratory distress.      HENT:  Head: Normocephalic and atraumatic.  Mouth:  Moist mucous membranes.    Neck:  Neck supple.  No JVD present.    Cardiovascular:  Normal rate, regular rhythm and normal heart sounds with no murmur.  Pulmonary/Chest:  No respiratory distress, no wheezes, no crackles, with normal breath  "sounds and good air movement.  Abdominal:  Soft.  Bowel sounds are normal.  No distension and no tenderness.   Musculoskeletal:  No edema, no tenderness, and no deformity.  No red or swollen joints anywhere.    Neurological:  Alert and oriented to person, place, and time.  No cranial nerve deficit.  No tongue deviation.  No facial droop.  No slurred speech.   Skin:  Skin is warm and dry.  No rash noted.  No pallor.     ---------------------------------------------------------------------------------------------------------------------  EKG: Atrial fibrillation with rapid ventricular response.  ST elevation inferolateral leads.    I have personally looked at  the EKG.  ---------------------------------------------------------------------------------------------------------------------   Results from last 7 days   Lab Units 03/23/25  0707   WBC 10*3/mm3 10.81*   HEMOGLOBIN g/dL 14.6   HEMATOCRIT % 45.1   MCV fL 95.6   MCHC g/dL 32.4   PLATELETS 10*3/mm3 386               Invalid input(s): \"PROT\"CrCl cannot be calculated (Patient's most recent lab result is older than the maximum 30 days allowed.).  No results found for: \"AMMONIA\"          No results found for: \"HGBA1C\"  No results found for: \"TSH\", \"FREET4\"  No results found for: \"PREGTESTUR\", \"PREGSERUM\", \"HCG\", \"HCGQUANT\"  Pain Management Panel           No data to display              No results found for: \"BLOODCX\"  No results found for: \"URINECX\"  No results found for: \"WOUNDCX\"  No results found for: \"STOOLCX\"      ---------------------------------------------------------------------------------------------------------------------  Imaging Results (Last 7 Days)       Procedure Component Value Units Date/Time    XR Chest 1 View [[id number]] Resulted: 03/23/25 0704     Updated: 03/23/25 0704            I have personally reviewed the radiology images and read the final radiology " report.  ---------------------------------------------------------------------------------------------------------------------  Assessment and Plan:   Inferolateral ST elevation MI  History of atrial fibrillation not anticoagulated  History of hematuria  Hypertension  Dyslipidemia    Plan:  -Discussed risks and benefits of coronary angiogram and the importance of taking DAPT.  Patient agreed to proceed.  -Echocardiogram.  -Further recs to follow.    Diana Gupta MD     This document has been electronically signed by Diana Gupta MD [facility], Interventional Cardiology  March 23, 2025 07:28 EDT

## 2025-03-23 NOTE — PROCEDURES
"Intubation    Date/Time: 3/23/2025 12:07 PM    Performed by: Yunior Bergeron DO  Authorized by: Yunior Bergeron DO  Consent: The procedure was performed in an emergent situation  Procedure consent: procedure consent matches procedure scheduled  Relevant documents: relevant documents present and verified  Test results: test results available and properly labeled  Site marked: the operative site was marked  Imaging studies: imaging studies available  Required items: required blood products, implants, devices, and special equipment available  Patient identity confirmed: anonymous protocol, patient vented/unresponsive  Time out: Immediately prior to procedure a \"time out\" was called to verify the correct patient, procedure, equipment, support staff and site/side marked as required.  Indications: respiratory failure  Intubation method: direct  Patient status: unconscious  Preoxygenation: BVM  Sedatives: etomidate  Laryngoscope size: Mac 3  Tube size: 7.0 mm  Tube type: cuffed  Number of attempts: 1  Post-procedure assessment: ETCO2 monitor and chest rise  Breath sounds: equal  Cuff inflated: yes  ETT to lip: 23 cm  Tube secured with: ETT holt and adhesive tape  Comments: No CXR- CPR was in progress. Tube visualized through the cords on placement        "

## 2025-03-23 NOTE — Clinical Note
POC Creatinine = 0.9 (mg/dL). POC Creatinine was drawn at 07:55 EDT. POC Creatinine result was completed at 08:00 EDT.

## 2025-03-23 NOTE — PROGRESS NOTES
Ms. Meza is our 85 yo F with hx of HLD, HTN, persistent afib who presented earlier this morning for chest pain. 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 Patient had aspiration thrombectomy and aspiration PTA of distal LAD.  Per report patient was stable after LHC this morning. Patient suddenly became unresponsive after calling out, with blank look on her face per nursing staff. No precipitating signs of symptoms per nursing staff. Code blue called and I responded directly after code was called. Patient was in PEA arrest in first round of compressions. ACLS provided with epinepherine, mag, calcium, sodium bicarb and patient intubated the first couple of cycles. Saturating well, bagging easy per respiratory. Dr. Gupta bedside. Ultrasound showed pericardial effusion and thought to possibly be causing tamponade leading to arrest. Given LAD occlusion appeared to be embolic phenomenon, PE or other embolic phenomenon considered.  Emergent pericardiocentesis attempted. Patient given IVF.  ROSC obtained. Patient became bradycardic and arrested again. Patient had ROSC a second time. Rhythm during arrests was PEA, bradycardic at times.  After the second Dr. Gupta talked to family and given grim prognosis at this juncture in the arrest resuscitation decided to make her DNR if she arrested again. Patient started on epinepherine gtt, levophed gtt for hypotension and bradycardia. Unfortunately patient became bradycardic again and lost pulse. No activity on bedside echo. I discussed with family and patient made comfort measures as we terminally extubated and removed gtts. I performed death exam after life support was removed with TOD at 12:16 pm.

## 2025-03-23 NOTE — ED PROVIDER NOTES
"Subjective   History of Present Illness  Patient presents today with chest \"discomfort\" that started at approximately 6:15 AM.  Patient states that when the pain initially started it radiated into her back but never had a sharp or tearing quality.  Patient has remained the same nature/character with associated palpitations, lightheadedness, and nausea.  The patient states that it does not radiate.  The patient states that she has had negligible shortness of breath and denies any other cardiorespiratory or URTI symptoms on further review.  The patient stated that she felt some slight abdominal discomfort as well but otherwise denies any other GI or  symptoms on review.      Diagnostic Testing  1. Left heart catheterization 2017: Mild plaquing in the LAD otherwise unremarkable  2. Cardiac event monitor April 2021: Normal sinus rhythm with no arrhythmias noted  3. Echocardiogram 6/2021: EF greater than 70% with mild concentric hypertrophy  4. Nuclear stress test 6/2021: Mild fixed with mid anterior wall defect with normal wall motion with no significant reversibility consistent with breast attenuation artifact with no ischemia        PMHx:  · Dyslipidemia  · Hypertension  · Chest pain  · Precordial chest pain  · Palpitations  · Essential hypertension  · Mixed hyperlipidemia  · COVID-19  · Microscopic hematuria  · Nonrheumatic aortic (valve) stenosis  · Bradycardia, sinus  · ASCVD (arteriosclerotic cardiovascular disease)  · Atrial fibrillation, persistent  · Mixed incontinence          Review of Systems   Constitutional:  Positive for activity change. Negative for appetite change, chills, diaphoresis, fatigue and fever.   HENT: Negative.     Respiratory:  Positive for shortness of breath. Negative for cough, wheezing and stridor.    Cardiovascular:  Positive for chest pain. Negative for palpitations.   Gastrointestinal:  Positive for nausea. Negative for vomiting.   Skin: Negative.    Neurological:  Positive for " light-headedness. Negative for dizziness, syncope and headaches.       Past Medical History:   Diagnosis Date    Chest pain     Dyslipidemia     Environmental allergies     Hypertension        No Known Allergies    Past Surgical History:   Procedure Laterality Date    CATARACT EXTRACTION Bilateral     OCTOBER AND DECEMBER    CHOLECYSTECTOMY      TONSILLECTOMY         Family History   Problem Relation Age of Onset    Heart disease Mother     Heart attack Mother     Heart attack Father     Heart disease Maternal Uncle     Breast cancer Neg Hx        Social History     Socioeconomic History    Marital status:    Tobacco Use    Smoking status: Never    Smokeless tobacco: Never   Vaping Use    Vaping status: Never Used   Substance and Sexual Activity    Alcohol use: No    Drug use: No    Sexual activity: Defer           Objective   Physical Exam  Vitals and nursing note reviewed.   Constitutional:       General: She is awake. She is not in acute distress.     Appearance: She is overweight. She is not ill-appearing, toxic-appearing or diaphoretic.   HENT:      Head: Normocephalic and atraumatic.      Mouth/Throat:      Mouth: Mucous membranes are moist.      Pharynx: Oropharynx is clear. No oropharyngeal exudate or posterior oropharyngeal erythema.   Eyes:      General:         Right eye: No discharge.         Left eye: No discharge.      Extraocular Movements: Extraocular movements intact.      Conjunctiva/sclera: Conjunctivae normal.      Pupils: Pupils are equal, round, and reactive to light.   Cardiovascular:      Rate and Rhythm: Tachycardia present. Rhythm irregular.      Heart sounds: Murmur heard.      No friction rub. No gallop.   Pulmonary:      Effort: Pulmonary effort is normal. No respiratory distress.      Breath sounds: Normal breath sounds. No stridor.   Abdominal:      General: Abdomen is flat. There is no distension.      Palpations: Abdomen is soft. There is no mass.      Tenderness: There is no  abdominal tenderness.      Hernia: No hernia is present.   Musculoskeletal:      Right lower leg: No edema.      Left lower leg: No edema.   Skin:     Coloration: Skin is not jaundiced.      Findings: No bruising, erythema or rash.   Neurological:      General: No focal deficit present.      Mental Status: She is alert and oriented to person, place, and time. Mental status is at baseline.      Cranial Nerves: No cranial nerve deficit.   Psychiatric:         Behavior: Behavior is cooperative.         Procedures           ED Course                                                       Medical Decision Making  Amount and/or Complexity of Data Reviewed  Labs: ordered.  ECG/medicine tests: ordered.    Risk  OTC drugs.  Prescription drug management.        Final diagnoses:   None       ED Disposition  ED Disposition       None            No follow-up provider specified.       Medication List      No changes were made to your prescriptions during this visit.          [id number]  [phone]               Medication List      No changes were made to your prescriptions during this visit.            Frnacis Brock MD  04/01/25 1203

## 2025-03-24 NOTE — PAYOR COMM NOTE
"CONTACT:  CARSON FOX, RN  UTILIZATION MANAGEMENT DEPT.   [facility]    [address] [facility], [address]   PHONE:  [phone]   FAX: [phone]   NPI [id number]      INPATIENT AUTH REQUEST     3/23/2025            Rosaura Sams (Dcsd. Female)       Date of Birth   1940    Social Security Number       Address   [address] [place] [address]    Home Phone   [phone]    MRN   [id number]       Baptism   Non-Islam    Marital Status                               Admission Date   3/23/2025    Admission Type   Emergency    Admitting Provider   Diana Gupta MD    Attending Provider       Department, Room/Bed   [facility] CRITICAL CARE, CC03/       Discharge Date   3/23/2025    Discharge Disposition       Discharge Destination                                 Attending Provider: (none)   Allergies: No Known Allergies    Isolation: None   Infection: None   Code Status: Prior    Ht: 157.5 cm (62\")   Wt: 56.9 kg (125 lb 7.1 oz)    Admission Cmt: None   Principal Problem: STEMI (ST elevation myocardial infarction) [I21.3]                   Active Insurance as of 3/23/2025       Primary Coverage       Payor Plan Insurance Group Employer/Plan Group    [employer] MEDICARE REPLACEMENT UHC MEDICARE ADVANTAGE GROUP [id number]       Payor Plan Address Payor Plan Phone Number Payor Plan Fax Number Effective Dates    [address] [address]   2023 - None Entered    [facility] [id number]         Subscriber Name Subscriber Birth Date Member ID       ROSAURA SAMS 1940 [id number]                     Emergency Contacts        (Rel.) Home Phone Work Phone Mobile Phone    YangHoracioLia (Relative) [phone] -- [phone]    caitlin sams (Grandchild) [phone] -- --    cayetanoaisha reynaga (Grandchild) [phone] -- --                 History & Physical        Diana Gupta MD at 25 0728                Patient " Identification:  Name:  Juan José Meza  Age:  84 y.o.  Sex:  female  :  1940  MRN:  [id number]   Visit Number:  [id number]  Primary Care Physician:  Bridget Segal MD    Chief complaint:   Chest pain  History of presenting illness:      84-year-old pleasant female presented to the ER with complaints of chest pain that started around 6 AM.  Patient was already up.  She said the pain started between the shoulder blades and then radiated to the chest.  No associated shortness of breath, sweating, nausea or vomiting.  In the ER EKG showed inferolateral ST elevation MI.    Patient has a history of atrial fibrillation but could not tolerate Eliquis or Xarelto because of hematuria.  She took 1 dose and then had the bleeding.  She was seen by urology and workup showed mild left renal hydronephrosis without definite obstruction.  Patient had refused cystoscopy workup.    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    ROS: All systems reviewed and negative except as mentioned above.     ---------------------------------------------------------------------------------------------------------------------   Past Medical History:   Diagnosis Date    Chest pain     Dyslipidemia     Environmental allergies     Hypertension      Past Surgical History:   Procedure Laterality Date    CATARACT EXTRACTION Bilateral     OCTOBER AND DECEMBER    CHOLECYSTECTOMY      TONSILLECTOMY       Family History   Problem Relation Age of Onset    Heart disease Mother     Heart attack Mother     Heart attack Father     Heart disease Maternal Uncle     Breast cancer Neg Hx      Social History     Socioeconomic History    Marital status:    Tobacco Use    Smoking status: Never    Smokeless tobacco: Never   Vaping Use    Vaping status: Never Used   Substance and Sexual Activity    Alcohol  use: No    Drug use: No    Sexual activity: Defer     ---------------------------------------------------------------------------------------------------------------------   Allergies:  Patient has no known allergies.  ---------------------------------------------------------------------------------------------------------------------   Prior to Admission Medications       Prescriptions Last Dose Informant Patient Reported? Taking?    acetaminophen (TYLENOL) 500 MG tablet   Yes No    Take 1 tablet by mouth Every 6 (Six) Hours As Needed for Mild Pain.    Bacillus Coagulans-Inulin (Probiotic) 1-250 BILLION-MG capsule   Yes No    Take  by mouth.    Calcium Carbonate-Vitamin D (CALCIUM PLUS VITAMIN D PO)   Yes No    Take 1,200 mg by mouth Daily.    Cyanocobalamin (HM SUPER VITAMIN B12 PO)   Yes No    Take  by mouth Daily.    Multiple Vitamins-Minerals (Emergen-C Immune) pack   Yes No    Take  by mouth As Needed.    NON FORMULARY   Yes No    Take 6,900 mg by mouth 3 times a day. PNEUMOTROPHIN [facility] Scheduled Meds:  aspirin, 324 mg, Oral, Once         ---------------------------------------------------------------------------------------------------------------------   Vital Signs:  Temp:  [98 °F (36.7 °C)] 98 °F (36.7 °C)  Heart Rate:  [135-161] 161  Resp:  [18] 18  BP: (153-193)/(105-109) 153/109      03/23/25  0709   Weight: 63.5 kg (140 lb)     Body mass index is 27.34 kg/m².  ---------------------------------------------------------------------------------------------------------------------   Physical Exam:  Constitutional:  Well-developed and well-nourished.  No respiratory distress.      HENT:  Head: Normocephalic and atraumatic.  Mouth:  Moist mucous membranes.    Neck:  Neck supple.  No JVD present.    Cardiovascular:  Normal rate, regular rhythm and normal heart sounds with no murmur.  Pulmonary/Chest:  No respiratory distress, no wheezes, no crackles, with normal breath sounds and good air  "movement.  Abdominal:  Soft.  Bowel sounds are normal.  No distension and no tenderness.   Musculoskeletal:  No edema, no tenderness, and no deformity.  No red or swollen joints anywhere.    Neurological:  Alert and oriented to person, place, and time.  No cranial nerve deficit.  No tongue deviation.  No facial droop.  No slurred speech.   Skin:  Skin is warm and dry.  No rash noted.  No pallor.     ---------------------------------------------------------------------------------------------------------------------  EKG: Atrial fibrillation with rapid ventricular response.  ST elevation inferolateral leads.    I have personally looked at  the EKG.  ---------------------------------------------------------------------------------------------------------------------   Results from last 7 days   Lab Units 03/23/25  0707   WBC 10*3/mm3 10.81*   HEMOGLOBIN g/dL 14.6   HEMATOCRIT % 45.1   MCV fL 95.6   MCHC g/dL 32.4   PLATELETS 10*3/mm3 386               Invalid input(s): \"PROT\"CrCl cannot be calculated (Patient's most recent lab result is older than the maximum 30 days allowed.).  No results found for: \"AMMONIA\"          No results found for: \"HGBA1C\"  No results found for: \"TSH\", \"FREET4\"  No results found for: \"PREGTESTUR\", \"PREGSERUM\", \"HCG\", \"HCGQUANT\"  Pain Management Panel           No data to display              No results found for: \"BLOODCX\"  No results found for: \"URINECX\"  No results found for: \"WOUNDCX\"  No results found for: \"STOOLCX\"      ---------------------------------------------------------------------------------------------------------------------  Imaging Results (Last 7 Days)       Procedure Component Value Units Date/Time    XR Chest 1 View [[id number]] Resulted: 03/23/25 0704     Updated: 03/23/25 0704            I have personally reviewed the radiology images and read the final radiology " report.  ---------------------------------------------------------------------------------------------------------------------  Assessment and Plan:   Inferolateral ST elevation MI  History of atrial fibrillation not anticoagulated  History of hematuria  Hypertension  Dyslipidemia    Plan:  -Discussed risks and benefits of coronary angiogram and the importance of taking DAPT.  Patient agreed to proceed.  -Echocardiogram.  -Further recs to follow.    Diana Gupta MD     This document has been electronically signed by Diana Gupta MD [facility], Interventional Cardiology  March 23, 2025 07:28 EDT        Electronically signed by Diana Gupta MD at 03/23/25 0738          Emergency Department Notes        Becki Morrison RN at 03/23/25 0740          Cath lab called and pt transported to cath lab.    Electronically signed by Becki Morrison RN at 03/23/25 0747       Becki Morrison RN at 03/23/25 0722          Dr. Buckner interventional cardiologist arrived and at bedside evaluating pt at this time.    Electronically signed by Becki Morrison RN at 03/23/25 0722       Becki Morrison RN at 03/23/25 0716          Pt belongings including clothes/purse/necklace and placed belongings bag and given to sweta Ricci per pt request.    Electronically signed by Becki Morrison RN at 03/23/25 0717       Becki Morrison RN at 03/23/25 0705          Pt reports took 325mg asa prior to coming to er today, er provider notified    Electronically signed by Becki Morrison RN at 03/23/25 0714       Comfort Bustamante, PCT at 03/23/25 0702          Dr. Brock on the line with Dr. Buckner, interventional cardiologist , at this time     Electronically signed by Comfort Bustamante, PCT at 03/23/25 0703       Comfort Bustamante, PCT at 03/23/25 0658          On- call interventional cardiologist notified of patients EKG at this time     Electronically signed by Comfort Bustamante, PCT at 03/23/25 0701       Robby,  Comfort, PCT at 03/23/25 0657          Called switchboard to activate cath lab per Dr. Brock at this time     Electronically signed by Comfort Bustamante PCT at 03/23/25 0702       Facility-Administered Medications as of 3/23/2025   Medication Dose Route Frequency Provider Last Rate Last Admin    [COMPLETED] amiodarone 150 mg in 100 mL D5W (loading dose)  150 mg Intravenous Once Diana Gupta MD   150 mg at 03/23/25 0923    [COMPLETED] atropine sulfate 0.1 mg/mL injection  - ADS Override Pull     Scar Agarwal MD   Given at 03/23/25 1150    [COMPLETED] atropine sulfate injection   Intravenous Code / Trauma / Sedation Medication Scar Agarwal MD   0.5 mg at 03/23/25 1150    [COMPLETED] EPINEPHrine (ADRENALIN) injection   Intravenous Code / Trauma / Sedation Medication Scar Agarwal MD   1 mg at 03/23/25 1139    [COMPLETED] EPINEPHrine 5 mg in 250 mL NS infusion    Code / Trauma / Sedation Continuous Med Scar Agarwal MD 5 mL/hr at 03/23/25 1845 0.026 mcg/kg/min at 03/23/25 1845    [COMPLETED] eptifibatide (INTEGRILIN) 75 mg in 100 mL solution    Code / Trauma / Sedation Continuous Med Diana Gutpa MD 10.16 mL/hr at 03/23/25 0821 2 mcg/kg/min at 03/23/25 0821    [COMPLETED] etomidate (AMIDATE) injection   Intravenous Code / Trauma / Sedation Medication Scar Agarwal MD   10 mg at 03/23/25 1114    [COMPLETED] heparin (porcine) 5000 UNIT/ML injection 3,800 Units  60 Units/kg Intravenous Once Francis Brock MD   3,800 Units at 03/23/25 0707    [COMPLETED] magnesium sulfate injection    Code / Trauma / Sedation Medication Scar Agarwal MD   1 g at 03/23/25 1847    [COMPLETED] metoprolol tartrate (LOPRESSOR) injection 2.5 mg  2.5 mg Intravenous Once Francis Brock MD   2.5 mg at 03/23/25 0727    [COMPLETED] norepinephrine (LEVOPHED) 8 mg in 250 mL NS infusion (premix)    Code / Trauma / Sedation Continuous Med Scar Agarwal MD 35.7 mL/hr at 03/23/25 1904 0.3 mcg/kg/min at 03/23/25  "1904    [COMPLETED] phenylephrine (ROSA-SYNEPHRINE) 1 MG/10ML injection  - ADS Override Pull     Scar Agarwal MD   Given at 03/23/25 1115    [COMPLETED] phenylephrine (ROSA-SYNEPHRINE) 1 MG/10ML injection    Code / Trauma / Sedation Medication Scar Agarwal MD   100 mcg at 03/23/25 1115    [COMPLETED] sodium bicarbonate injection 8.4%    Code / Trauma / Sedation Medication Scar Agarwal MD   50 mEq at 03/23/25 1138    [COMPLETED] sodium chloride 0.9 % bolus    Code / Trauma / Sedation Continuous Med Scar Agarwal  mL/hr at 03/23/25 1127 1,000 mL at 03/23/25 1127    [COMPLETED] sodium chloride 0.9 % infusion    Code / Trauma / Sedation Continuous Med Diana Gupta MD 75 mL/hr at 03/23/25 0749 75 mL/hr at 03/23/25 0749    [COMPLETED] ticagrelor (BRILINTA) tablet 180 mg  180 mg Oral Once Francis Brock MD   180 mg at 03/23/25 0707     Orders (all)        Start     Ordered    03/24/25 0900  aspirin EC tablet 81 mg  Daily,   Status:  Discontinued         03/23/25 1035    03/24/25 0700  ECG 12 Lead Chest Pain  Once,   Status:  Canceled         03/23/25 1035    03/24/25 0600  CBC (No Diff)  Morning Draw,   Status:  Canceled         03/23/25 1035    03/24/25 0600  Basic Metabolic Panel  Morning Draw,   Status:  Canceled         03/23/25 1035    03/24/25 0600  Lipid Panel  Morning Draw,   Status:  Canceled        Comments: Fasting      03/23/25 1035    03/24/25 0600  Hemoglobin A1c  Morning Draw,   Status:  Canceled         03/23/25 1035    03/23/25 1847  magnesium sulfate injection  Code / Trauma / Sedation Medication         03/23/25 1847    03/23/25 1845  EPINEPHrine 5 mg in 250 mL NS infusion  Code / Trauma / Sedation Continuous Med         03/23/25 1846    03/23/25 1800  ticagrelor (BRILINTA) tablet 90 mg  2 Times Daily,   Status:  Discontinued         03/23/25 1035    03/23/25 1536  amiodarone 360 mg in 200 mL D5W infusion  Continuous,   Status:  Discontinued        Placed in \"Followed by\" " Linked Group    03/23/25 0843    03/23/25 1337  ECG 12 Lead Rhythm Change  STAT         03/23/25 1337    03/23/25 1230  norepinephrine (LEVOPHED) 8 mg in 250 mL NS infusion (premix)  Titrated,   Status:  Discontinued         03/23/25 1143    03/23/25 1215  fentaNYL 2500 mcg/250 mL NS infusion  Titrated,   Status:  Discontinued         03/23/25 1117    03/23/25 1215  propofol (DIPRIVAN) infusion 10 mg/mL 100 mL  Titrated,   Status:  Discontinued         03/23/25 1117    03/23/25 1215  EPINEPHrine 5 mg in 250 mL NS infusion  Titrated,   Status:  Discontinued         03/23/25 1127    03/23/25 1210  Code Status and Medical Interventions: No CPR (Do Not Attempt to Resuscitate); Comfort Measures  Continuous,   Status:  Canceled         03/23/25 1209    03/23/25 1208  Intubation  Once        Comments: This order was created via procedure documentation    03/23/25 1207    03/23/25 1200  Strict Intake & Output  Every 4 Hours,   Status:  Canceled       03/23/25 1035    03/23/25 1200  Incentive Spirometry  Every 2 Hours While Awake,   Status:  Canceled       03/23/25 1035    03/23/25 1159  Intubation  Once,   Status:  Canceled         03/23/25 1158    03/23/25 1158  Ventilator - Vent Mode: AC/VC; Rate: Other; Rate: 16; FiO2: 100%; PEEP: 5; Tidal Volume: mL; TV: 420  Continuous,   Status:  Canceled         03/23/25 1158    03/23/25 1144  Okay to start EPI at max dose  Misc Nursing Order (Specify)  Once,   Status:  Canceled        Comments: Okay to start EPI at max dose    03/23/25 1144    03/23/25 1141  norepinephrine (LEVOPHED) 8 mg in 250 mL NS infusion (premix)  Code / Trauma / Sedation Continuous Med         03/23/25 1903    03/23/25 1137  atropine sulfate 0.1 mg/mL injection  - ADS Override Pull        Note to Pharmacy: Created by cabinet override    03/23/25 1137    03/23/25 1130  sodium chloride 0.9 % infusion  Continuous,   Status:  Discontinued         03/23/25 1035    03/23/25 1130  eptifibatide (INTEGRILIN) 75 mg in  100 mL solution  Continuous,   Status:  Discontinued         03/23/25 1035    03/23/25 1129  sodium bicarbonate injection 8.4%  Code / Trauma / Sedation Medication         03/23/25 1847    03/23/25 1127  sodium chloride 0.9 % bolus  Code / Trauma / Sedation Continuous Med         03/23/25 1845    03/23/25 1123  POC Glucose Once  PROCEDURE ONCE        Comments: Complete no more than 45 minutes prior to patient eating      03/23/25 1106    03/23/25 1121  atropine sulfate injection  Code / Trauma / Sedation Medication         03/23/25 1843    03/23/25 1117  Bowel Regimen Not Indicated  Once         03/23/25 1117    03/23/25 1115  phenylephrine (ROSA-SYNEPHRINE) 1 MG/10ML injection  Code / Trauma / Sedation Medication         03/23/25 1842    03/23/25 1114  phenylephrine (ROSA-SYNEPHRINE) 1 MG/10ML injection  - ADS Override Pull        Note to Pharmacy: Created by cabinet override    03/23/25 1114    03/23/25 1114  etomidate (AMIDATE) injection  Code / Trauma / Sedation Medication         03/23/25 1841    03/23/25 1108  EPINEPHrine (ADRENALIN) injection  Code / Trauma / Sedation Medication         03/23/25 1839    03/23/25 1036  Code Status and Medical Interventions: CPR (Attempt to Resuscitate); Full Support  Continuous,   Status:  Canceled         03/23/25 1035    03/23/25 1036  Vital Signs, Site Check & Distal Extremity Check for Warmth, Color, Sensation & Pulses  Per Order Details,   Status:  Canceled         03/23/25 1035    03/23/25 1036  Continuous Cardiac Monitoring  Continuous,   Status:  Canceled        Comments: Follow Standing Orders As Outlined in Process Instructions (Open Order Report to View Full Instructions)    03/23/25 1035    03/23/25 1036  Maintain IV Access  Continuous,   Status:  Canceled         03/23/25 1035    03/23/25 1036  Telemetry - Place Orders & Notify Provider of Results When Patient Experiences Acute Chest Pain, Dysrhythmia or Respiratory Distress  Continuous,   Status:  Canceled         Comments: Open Order Report to View Parameters Requiring Provider Notification    03/23/25 1035 03/23/25 1036  May Be Off Telemetry for Tests  Continuous,   Status:  Canceled         03/23/25 1035 03/23/25 1036  Encourage Fluids  Until Discontinued,   Status:  Canceled         03/23/25 1035 03/23/25 1036  Continuous Pulse Oximetry  Continuous,   Status:  Canceled         03/23/25 1035 03/23/25 1036  Advance Diet As Tolerated -  Until Discontinued,   Status:  Canceled         03/23/25 1035 03/23/25 1036  Notify MD if platelet count is less than 100,000, is less than 1/2 baseline, or if Hgb drops by more than 3mg/dl.  Until Discontinued,   Status:  Canceled         03/23/25 1035 03/23/25 1036  Notify MD of hypotension (SBP less than 95), bleeding, or dysrythmia and follow Sheath Removal Policy if needed.  Continuous,   Status:  Canceled         03/23/25 1035 03/23/25 1036  Cardiac Rehab Evaluation and Enrollment  Once,   Status:  Canceled        Provider:  (Not yet assigned)    03/23/25 1035 03/23/25 1036  Hold metFORMIN (GLUCOPHAGE) for 48 Hours  Continuous,   Status:  Canceled         03/23/25 1035 03/23/25 1036  Closure Device Used  Once,   Status:  Canceled         03/23/25 1035 03/23/25 1036  Assess Puncture Site, Vital Signs, Distal Pulses & Observe Site for Bleeding or Swelling  Per Order Details,   Status:  Canceled        Comments: Every 15 Minutes x4, Every 30 Minutes x4, & Every 1 Hour x2    03/23/25 1035 03/23/25 1036  Diet: Cardiac; Healthy Heart (2-3 Na+); Fluid Consistency: Thin (IDDSI 0)  Diet Effective Now,   Status:  Canceled         03/23/25 1035 03/23/25 1035  nitroglycerin (NITROSTAT) SL tablet 0.4 mg  Every 5 Minutes PRN,   Status:  Discontinued         03/23/25 1035 03/23/25 1035  Change Site Dressing  As Needed,   Status:  Canceled       03/23/25 1035 03/23/25 1035  Oxygen Therapy- Nasal Cannula; Titrate 1-6 LPM Per SpO2; 90 - 95%  Continuous PRN,    "Status:  Canceled       03/23/25 1035    03/23/25 1035  acetaminophen (TYLENOL) tablet 650 mg  Every 4 Hours PRN,   Status:  Discontinued         03/23/25 1035    03/23/25 1035  Head of Bed: Flat; 2 Hours; Elevate Head of Bed: 30 Degrees; Keep Affected Extremity/ Extremities Straight; Total Bedrest Time? Other; Length of Time: 3 hrs  As Needed,   Status:  Canceled       03/23/25 1035    03/23/25 0900  metoprolol tartrate (LOPRESSOR) tablet 12.5 mg  Every 12 Hours Scheduled,   Status:  Discontinued         03/23/25 0843    03/23/25 0845  atorvastatin (LIPITOR) tablet 40 mg  Nightly,   Status:  Discontinued         03/23/25 0843    03/23/25 0845  amiodarone 360 mg in 200 mL D5W infusion  Continuous,   Status:  Discontinued        Placed in \"Followed by\" Linked Group    03/23/25 0843    03/23/25 0845  Inpatient Hospitalist Consult  Once,   Status:  Canceled        Specialty:  Hospitalist  Provider:  Scar Agarwal MD    03/23/25 0844    03/23/25 0844  amiodarone 150 mg in 100 mL D5W (loading dose)  Once        Placed in \"Followed by\" Linked Group    03/23/25 0843    03/23/25 0843  Adult Transthoracic Echo Complete W/ Cont if Necessary Per Protocol  Once,   Status:  Canceled         03/23/25 0843    03/23/25 0839  Inpatient Admission  Once         03/23/25 0843    03/23/25 0828  High Sensitivity Troponin T 1Hr  PROCEDURE ONCE         03/23/25 0806    03/23/25 0827  iopamidol (ISOVUE-370) 76 % injection  Code / Trauma / Sedation Medication,   Status:  Discontinued         03/23/25 0843    03/23/25 0821  eptifibatide (INTEGRILIN) 75 mg in 100 mL solution  Code / Trauma / Sedation Continuous Med         03/23/25 0821    03/23/25 0819  Eptifibatide (INTEGRILIN) injection  Code / Trauma / Sedation Medication,   Status:  Discontinued         03/23/25 0821    03/23/25 0759  heparin (porcine) injection  Code / Trauma / Sedation Medication,   Status:  Discontinued         03/23/25 0800    03/23/25 0749  lidocaine (XYLOCAINE) 2% " injection  Code / Trauma / Sedation Medication,   Status:  Discontinued         03/23/25 0750    03/23/25 0749  sodium chloride 0.9 % infusion  Code / Trauma / Sedation Continuous Med         03/23/25 0749    03/23/25 0741  Cardiac Catheterization/Vascular Study  Once         03/23/25 0740    03/23/25 0739  metoprolol tartrate (LOPRESSOR) injection 2.5 mg  Once         03/23/25 0723    03/23/25 0738  metoprolol tartrate (LOPRESSOR) injection 5 mg  Once,   Status:  Discontinued         03/23/25 0722    03/23/25 0735  ticagrelor (BRILINTA) tablet 180 mg  Once         03/23/25 0719    03/23/25 0726  heparin (porcine) 5000 UNIT/ML injection 3,800 Units  Once         03/23/25 0710    03/23/25 0722  Comprehensive Metabolic Panel  Once         03/23/25 0653    03/23/25 0722  High Sensitivity Troponin T  Once         03/23/25 0653    03/23/25 0709  aspirin chewable tablet 324 mg  Once,   Status:  Discontinued         03/23/25 0653    03/23/25 0704  XR Chest 1 View  1 Time Imaging,   Status:  Canceled         03/23/25 0653    03/23/25 0654  NPO Diet NPO Type: Strict NPO  Diet Effective Now,   Status:  Canceled         03/23/25 0653    03/23/25 0654  Undress and Gown  Once         03/23/25 0653    03/23/25 0654  Cardiac Monitoring  Continuous,   Status:  Canceled        Comments: Follow Standing Orders As Outlined in Process Instructions (Open Order Report to View Full Instructions)    03/23/25 0653    03/23/25 0654  Continuous Pulse Oximetry  Continuous,   Status:  Canceled         03/23/25 0653    03/23/25 0654  ECG 12 Lead ED Triage Standing Order; Chest Pain  Once         03/23/25 0653    03/23/25 0654  XR Chest 2 View  1 Time Imaging,   Status:  Canceled         03/23/25 0653    03/23/25 0654  Insert Peripheral IV  Once,   Status:  Canceled         03/23/25 0653    03/23/25 0654  [place] Draw  Once         03/23/25 0653    03/23/25 0654  CBC & Differential  Once         03/23/25 0653    03/23/25 0654  Green Top (Gel)   PROCEDURE ONCE         03/23/25 0653    03/23/25 0654  Lavender Top  PROCEDURE ONCE         03/23/25 0653    03/23/25 0654  Gold Top - SST  PROCEDURE ONCE         03/23/25 0653    03/23/25 0654  Light Blue Top  PROCEDURE ONCE         03/23/25 0653    03/23/25 0654  CBC Auto Differential  PROCEDURE ONCE         03/23/25 0653    03/23/25 0653  Oxygen Therapy- Nasal Cannula; Titrate 1-6 LPM Per SpO2; 90 - 95%  Continuous PRN,   Status:  Canceled       03/23/25 0653    03/23/25 0653  ECG 12 Lead ED Triage Standing Order; Chest Pain  As Needed,   Status:  Canceled      Comments: Persistent, Unrelieved or Recurrent Chest Pain    03/23/25 0653    03/23/25 0653  sodium chloride 0.9 % flush 10 mL  As Needed,   Status:  Discontinued         03/23/25 0653    03/23/25 0000  Telemetry Scan  Once         03/23/25 0000    03/23/25 0000  Telemetry Scan  Once         03/23/25 0000                     Physician Progress Notes (all)        Scar Agarwal MD at 03/23/25 1208            Ms. Meza is our 85 yo F with hx of HLD, HTN, persistent afib who presented earlier this morning for chest pain. 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 Patient had aspiration thrombectomy and aspiration PTA of distal LAD.  Per report patient was stable after LHC this morning. Patient suddenly became unresponsive after calling out, with blank look on her face per nursing staff. No precipitating signs of symptoms per nursing staff. Code blue called and I responded directly after code was called. Patient was in PEA arrest in first round of compressions. ACLS provided with epinepherine, mag, calcium, sodium bicarb and patient intubated the first couple of cycles. Saturating well, bagging easy per respiratory. Dr. Gupta bedside. Ultrasound showed pericardial effusion and thought to possibly be causing tamponade leading to arrest. Given LAD occlusion appeared to be embolic phenomenon, PE or other  embolic phenomenon considered.  Emergent pericardiocentesis attempted. Patient given IVF.  ROSC obtained. Patient became bradycardic and arrested again. Patient had ROSC a second time. Rhythm during arrests was PEA, bradycardic at times.  After the second Dr. Gupta talked to family and given grim prognosis at this juncture in the arrest resuscitation decided to make her DNR if she arrested again. Patient started on epinepherine gtt, levophed gtt for hypotension and bradycardia. Unfortunately patient became bradycardic again and lost pulse. No activity on bedside echo. I discussed with family and patient made comfort measures as we terminally extubated and removed gtts. I performed death exam after life support was removed with TOD at 12:16 pm.     Electronically signed by Scar Agarwal MD at 03/23/25 1315       Diana Gupta MD at 03/23/25 1159          Responded to CODE BLUE.  Patien became unresponsive suddenly and went into a PEA rhythm.  ACLS protocol was initiated.  She was not hypotensive before the cardiac arrest.  After 30 minutes of CPR patient had ROSC.  She was intubated during the procedure.  She subsequently became bradycardic and had a cardiac arrest again.  CPR was initiated and after 10 to 15 minutes there was ROSC.  She was on epinephrine drip and Levophed.  She received several rounds of epinephrine and atropine.  She also received bicarb.  On bedside ultrasound there appeared to be a pericardial effusion.  Bedside emergent pericardiocentesis was attempted during the code.  At this point the differential diagnosis was PEA arrest due to pulmonary embolism or CVA.  Unlikely to be secondary to pericardial effusion as the effusion was small and patient did not become hypotensive before the cardiac arrest.  I had multiple discussions with the patient's family and they made her DNR.  No further CPR if patient were to go into cardiac arrest.  Patient will be maintained on current  medications.    Electronically signed by Diana Gupta MD at 03/23/25 1205       Consult Notes (all)    No notes of this type exist for this encounter.

## 2025-03-24 NOTE — PAYOR COMM NOTE
"CONTACT:  CARSON FOX RN  UTILIZATION MANAGEMENT DEPT.   [facility]    [address] [facility], [address]   PHONE:  [phone]   FAX: [phone]   NPI [id number]        INPATIENT AUTH REQUEST          Rosaura Sams (Dcsd. Female)       Date of Birth   1940    Social Security Number       Address   [address] [place] [id number]    Home Phone   [phone]    MRN   [id number]       Pentecostalism   Non-Restorationism    Marital Status                               Admission Date   3/23/2025    Admission Type   Emergency    Admitting Provider   Diana Gupta MD    Attending Provider       Department, Room/Bed   [facility] CRITICAL CARE, CC03/       Discharge Date   3/23/2025    Discharge Disposition       Discharge Destination                                 Attending Provider: (none)   Allergies: No Known Allergies    Isolation: None   Infection: None   Code Status: Prior    Ht: 157.5 cm (62\")   Wt: 56.9 kg (125 lb 7.1 oz)    Admission Cmt: None   Principal Problem: STEMI (ST elevation myocardial infarction) [I21.3]                   Active Insurance as of 3/23/2025       Primary Coverage       Payor Plan Insurance Group Employer/Plan Group    [employer] MEDICARE REPLACEMENT UHC MEDICARE ADVANTAGE GROUP [id number]       Payor Plan Address Payor Plan Phone Number Payor Plan Fax Number Effective Dates    [address] [address]   2023 - None Entered    [facility] [id number]         Subscriber Name Subscriber Birth Date Member ID       ROSAURA SAMS 1940 [id number]                     Emergency Contacts        (Rel.) Home Phone Work Phone Mobile Phone    Lia Yang (Relative) [phone] -- [phone]    caitlin sams (Grandchild) [phone] -- --    aisha sams (Grandchild) [phone] -- --                 History & Physical        Diana Gupta MD at 25 0728                Patient " Identification:  Name:  Juan José Meza  Age:  84 y.o.  Sex:  female  :  1940  MRN:  [id number]   Visit Number:  [id number]  Primary Care Physician:  Bridget Segal MD    Chief complaint:   Chest pain  History of presenting illness:      84-year-old pleasant female presented to the ER with complaints of chest pain that started around 6 AM.  Patient was already up.  She said the pain started between the shoulder blades and then radiated to the chest.  No associated shortness of breath, sweating, nausea or vomiting.  In the ER EKG showed inferolateral ST elevation MI.    Patient has a history of atrial fibrillation but could not tolerate Eliquis or Xarelto because of hematuria.  She took 1 dose and then had the bleeding.  She was seen by urology and workup showed mild left renal hydronephrosis without definite obstruction.  Patient had refused cystoscopy workup.    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    ROS: All systems reviewed and negative except as mentioned above.     ---------------------------------------------------------------------------------------------------------------------   Past Medical History:   Diagnosis Date    Chest pain     Dyslipidemia     Environmental allergies     Hypertension      Past Surgical History:   Procedure Laterality Date    CATARACT EXTRACTION Bilateral     OCTOBER AND DECEMBER    CHOLECYSTECTOMY      TONSILLECTOMY       Family History   Problem Relation Age of Onset    Heart disease Mother     Heart attack Mother     Heart attack Father     Heart disease Maternal Uncle     Breast cancer Neg Hx      Social History     Socioeconomic History    Marital status:    Tobacco Use    Smoking status: Never    Smokeless tobacco: Never   Vaping Use    Vaping status: Never Used   Substance and Sexual Activity    Alcohol  use: No    Drug use: No    Sexual activity: Defer     ---------------------------------------------------------------------------------------------------------------------   Allergies:  Patient has no known allergies.  ---------------------------------------------------------------------------------------------------------------------   Prior to Admission Medications       Prescriptions Last Dose Informant Patient Reported? Taking?    acetaminophen (TYLENOL) 500 MG tablet   Yes No    Take 1 tablet by mouth Every 6 (Six) Hours As Needed for Mild Pain.    Bacillus Coagulans-Inulin (Probiotic) 1-250 BILLION-MG capsule   Yes No    Take  by mouth.    Calcium Carbonate-Vitamin D (CALCIUM PLUS VITAMIN D PO)   Yes No    Take 1,200 mg by mouth Daily.    Cyanocobalamin (HM SUPER VITAMIN B12 PO)   Yes No    Take  by mouth Daily.    Multiple Vitamins-Minerals (Emergen-C Immune) pack   Yes No    Take  by mouth As Needed.    NON FORMULARY   Yes No    Take 6,900 mg by mouth 3 times a day. PNEUMOTROPHIN [facility] Scheduled Meds:  aspirin, 324 mg, Oral, Once         ---------------------------------------------------------------------------------------------------------------------   Vital Signs:  Temp:  [98 °F (36.7 °C)] 98 °F (36.7 °C)  Heart Rate:  [135-161] 161  Resp:  [18] 18  BP: (153-193)/(105-109) 153/109      03/23/25  0709   Weight: 63.5 kg (140 lb)     Body mass index is 27.34 kg/m².  ---------------------------------------------------------------------------------------------------------------------   Physical Exam:  Constitutional:  Well-developed and well-nourished.  No respiratory distress.      HENT:  Head: Normocephalic and atraumatic.  Mouth:  Moist mucous membranes.    Neck:  Neck supple.  No JVD present.    Cardiovascular:  Normal rate, regular rhythm and normal heart sounds with no murmur.  Pulmonary/Chest:  No respiratory distress, no wheezes, no crackles, with normal breath sounds and good air  "movement.  Abdominal:  Soft.  Bowel sounds are normal.  No distension and no tenderness.   Musculoskeletal:  No edema, no tenderness, and no deformity.  No red or swollen joints anywhere.    Neurological:  Alert and oriented to person, place, and time.  No cranial nerve deficit.  No tongue deviation.  No facial droop.  No slurred speech.   Skin:  Skin is warm and dry.  No rash noted.  No pallor.     ---------------------------------------------------------------------------------------------------------------------  EKG: Atrial fibrillation with rapid ventricular response.  ST elevation inferolateral leads.    I have personally looked at  the EKG.  ---------------------------------------------------------------------------------------------------------------------   Results from last 7 days   Lab Units 03/23/25  0707   WBC 10*3/mm3 10.81*   HEMOGLOBIN g/dL 14.6   HEMATOCRIT % 45.1   MCV fL 95.6   MCHC g/dL 32.4   PLATELETS 10*3/mm3 386               Invalid input(s): \"PROT\"CrCl cannot be calculated (Patient's most recent lab result is older than the maximum 30 days allowed.).  No results found for: \"AMMONIA\"          No results found for: \"HGBA1C\"  No results found for: \"TSH\", \"FREET4\"  No results found for: \"PREGTESTUR\", \"PREGSERUM\", \"HCG\", \"HCGQUANT\"  Pain Management Panel           No data to display              No results found for: \"BLOODCX\"  No results found for: \"URINECX\"  No results found for: \"WOUNDCX\"  No results found for: \"STOOLCX\"      ---------------------------------------------------------------------------------------------------------------------  Imaging Results (Last 7 Days)       Procedure Component Value Units Date/Time    XR Chest 1 View [[id number]] Resulted: 03/23/25 0704     Updated: 03/23/25 0704            I have personally reviewed the radiology images and read the final radiology " report.  ---------------------------------------------------------------------------------------------------------------------  Assessment and Plan:   Inferolateral ST elevation MI  History of atrial fibrillation not anticoagulated  History of hematuria  Hypertension  Dyslipidemia    Plan:  -Discussed risks and benefits of coronary angiogram and the importance of taking DAPT.  Patient agreed to proceed.  -Echocardiogram.  -Further recs to follow.    Diana Gupta MD     This document has been electronically signed by Diana Gupta MD [facility], Interventional Cardiology  March 23, 2025 07:28 EDT        Electronically signed by Diana Gupta MD at 03/23/25 0738          Emergency Department Notes        Becki Morrison RN at 03/23/25 0740          Cath lab called and pt transported to cath lab.    Electronically signed by Becki Morrison RN at 03/23/25 0747       Becki Morrison RN at 03/23/25 0722          Dr. Buckner interventional cardiologist arrived and at bedside evaluating pt at this time.    Electronically signed by Becki Morrison RN at 03/23/25 0722       Becki Morrison RN at 03/23/25 0716          Pt belongings including clothes/purse/necklace and placed belongings bag and given to sweta Ricci per pt request.    Electronically signed by Becki Morrison RN at 03/23/25 0717       Becki Morrison RN at 03/23/25 0705          Pt reports took 325mg asa prior to coming to er today, er provider notified    Electronically signed by Becki Morrison RN at 03/23/25 0714       Comfort Bustamante, PCT at 03/23/25 0702          Dr. Brock on the line with Dr. Buckner, interventional cardiologist , at this time     Electronically signed by Comfort Bustamante, PCT at 03/23/25 0703       Comfort Bustamante, PCT at 03/23/25 0658          On- call interventional cardiologist notified of patients EKG at this time     Electronically signed by Comfort Bustamante, PCT at 03/23/25 0701       Robby,  Comfort, PCT at 03/23/25 0657          Called switchboard to activate cath lab per Dr. Brock at this time     Electronically signed by Comfort Bustamante PCT at 03/23/25 0702       Facility-Administered Medications as of 3/23/2025   Medication Dose Route Frequency Provider Last Rate Last Admin    [COMPLETED] amiodarone 150 mg in 100 mL D5W (loading dose)  150 mg Intravenous Once Diana Gupta MD   150 mg at 03/23/25 0923    [COMPLETED] atropine sulfate 0.1 mg/mL injection  - ADS Override Pull     Scar Agarwal MD   Given at 03/23/25 1150    [COMPLETED] atropine sulfate injection   Intravenous Code / Trauma / Sedation Medication Scar Agarwal MD   0.5 mg at 03/23/25 1150    [COMPLETED] EPINEPHrine (ADRENALIN) injection   Intravenous Code / Trauma / Sedation Medication Scar Agarwal MD   1 mg at 03/23/25 1139    [COMPLETED] EPINEPHrine 5 mg in 250 mL NS infusion    Code / Trauma / Sedation Continuous Med Scar Agarwal MD 5 mL/hr at 03/23/25 1845 0.026 mcg/kg/min at 03/23/25 1845    [COMPLETED] eptifibatide (INTEGRILIN) 75 mg in 100 mL solution    Code / Trauma / Sedation Continuous Med Diana Gupta MD 10.16 mL/hr at 03/23/25 0821 2 mcg/kg/min at 03/23/25 0821    [COMPLETED] etomidate (AMIDATE) injection   Intravenous Code / Trauma / Sedation Medication Scar Agarwal MD   10 mg at 03/23/25 1114    [COMPLETED] heparin (porcine) 5000 UNIT/ML injection 3,800 Units  60 Units/kg Intravenous Once Francis Brock MD   3,800 Units at 03/23/25 0707    [COMPLETED] magnesium sulfate injection    Code / Trauma / Sedation Medication Scar Agarwal MD   1 g at 03/23/25 1847    [COMPLETED] metoprolol tartrate (LOPRESSOR) injection 2.5 mg  2.5 mg Intravenous Once Francis Brock MD   2.5 mg at 03/23/25 0727    [COMPLETED] norepinephrine (LEVOPHED) 8 mg in 250 mL NS infusion (premix)    Code / Trauma / Sedation Continuous Med Scar Agarwal MD 35.7 mL/hr at 03/23/25 1904 0.3 mcg/kg/min at 03/23/25  "1904    [COMPLETED] phenylephrine (ROSA-SYNEPHRINE) 1 MG/10ML injection  - ADS Override Pull     Scar Agarwal MD   Given at 03/23/25 1115    [COMPLETED] phenylephrine (ROSA-SYNEPHRINE) 1 MG/10ML injection    Code / Trauma / Sedation Medication Scar Agarwal MD   100 mcg at 03/23/25 1115    [COMPLETED] sodium bicarbonate injection 8.4%    Code / Trauma / Sedation Medication Scar Agarwal MD   50 mEq at 03/23/25 1138    [COMPLETED] sodium chloride 0.9 % bolus    Code / Trauma / Sedation Continuous Med Scar Agarwal  mL/hr at 03/23/25 1127 1,000 mL at 03/23/25 1127    [COMPLETED] sodium chloride 0.9 % infusion    Code / Trauma / Sedation Continuous Med Diana Gupta MD 75 mL/hr at 03/23/25 0749 75 mL/hr at 03/23/25 0749    [COMPLETED] ticagrelor (BRILINTA) tablet 180 mg  180 mg Oral Once Francis Brock MD   180 mg at 03/23/25 0707     Orders (all)        Start     Ordered    03/24/25 0900  aspirin EC tablet 81 mg  Daily,   Status:  Discontinued         03/23/25 1035    03/24/25 0700  ECG 12 Lead Chest Pain  Once,   Status:  Canceled         03/23/25 1035    03/24/25 0600  CBC (No Diff)  Morning Draw,   Status:  Canceled         03/23/25 1035    03/24/25 0600  Basic Metabolic Panel  Morning Draw,   Status:  Canceled         03/23/25 1035    03/24/25 0600  Lipid Panel  Morning Draw,   Status:  Canceled        Comments: Fasting      03/23/25 1035    03/24/25 0600  Hemoglobin A1c  Morning Draw,   Status:  Canceled         03/23/25 1035    03/23/25 1847  magnesium sulfate injection  Code / Trauma / Sedation Medication         03/23/25 1847    03/23/25 1845  EPINEPHrine 5 mg in 250 mL NS infusion  Code / Trauma / Sedation Continuous Med         03/23/25 1846    03/23/25 1800  ticagrelor (BRILINTA) tablet 90 mg  2 Times Daily,   Status:  Discontinued         03/23/25 1035    03/23/25 1536  amiodarone 360 mg in 200 mL D5W infusion  Continuous,   Status:  Discontinued        Placed in \"Followed by\" " Linked Group    03/23/25 0843    03/23/25 1337  ECG 12 Lead Rhythm Change  STAT         03/23/25 1337    03/23/25 1230  norepinephrine (LEVOPHED) 8 mg in 250 mL NS infusion (premix)  Titrated,   Status:  Discontinued         03/23/25 1143    03/23/25 1215  fentaNYL 2500 mcg/250 mL NS infusion  Titrated,   Status:  Discontinued         03/23/25 1117    03/23/25 1215  propofol (DIPRIVAN) infusion 10 mg/mL 100 mL  Titrated,   Status:  Discontinued         03/23/25 1117    03/23/25 1215  EPINEPHrine 5 mg in 250 mL NS infusion  Titrated,   Status:  Discontinued         03/23/25 1127    03/23/25 1210  Code Status and Medical Interventions: No CPR (Do Not Attempt to Resuscitate); Comfort Measures  Continuous,   Status:  Canceled         03/23/25 1209    03/23/25 1208  Intubation  Once        Comments: This order was created via procedure documentation    03/23/25 1207    03/23/25 1200  Strict Intake & Output  Every 4 Hours,   Status:  Canceled       03/23/25 1035    03/23/25 1200  Incentive Spirometry  Every 2 Hours While Awake,   Status:  Canceled       03/23/25 1035    03/23/25 1159  Intubation  Once,   Status:  Canceled         03/23/25 1158    03/23/25 1158  Ventilator - Vent Mode: AC/VC; Rate: Other; Rate: 16; FiO2: 100%; PEEP: 5; Tidal Volume: mL; TV: 420  Continuous,   Status:  Canceled         03/23/25 1158    03/23/25 1144  Okay to start EPI at max dose  Misc Nursing Order (Specify)  Once,   Status:  Canceled        Comments: Okay to start EPI at max dose    03/23/25 1144    03/23/25 1141  norepinephrine (LEVOPHED) 8 mg in 250 mL NS infusion (premix)  Code / Trauma / Sedation Continuous Med         03/23/25 1903    03/23/25 1137  atropine sulfate 0.1 mg/mL injection  - ADS Override Pull        Note to Pharmacy: Created by cabinet override    03/23/25 1137    03/23/25 1130  sodium chloride 0.9 % infusion  Continuous,   Status:  Discontinued         03/23/25 1035    03/23/25 1130  eptifibatide (INTEGRILIN) 75 mg in  100 mL solution  Continuous,   Status:  Discontinued         03/23/25 1035    03/23/25 1129  sodium bicarbonate injection 8.4%  Code / Trauma / Sedation Medication         03/23/25 1847    03/23/25 1127  sodium chloride 0.9 % bolus  Code / Trauma / Sedation Continuous Med         03/23/25 1845    03/23/25 1123  POC Glucose Once  PROCEDURE ONCE        Comments: Complete no more than 45 minutes prior to patient eating      03/23/25 1106    03/23/25 1121  atropine sulfate injection  Code / Trauma / Sedation Medication         03/23/25 1843    03/23/25 1117  Bowel Regimen Not Indicated  Once         03/23/25 1117    03/23/25 1115  phenylephrine (ROSA-SYNEPHRINE) 1 MG/10ML injection  Code / Trauma / Sedation Medication         03/23/25 1842    03/23/25 1114  phenylephrine (ROSA-SYNEPHRINE) 1 MG/10ML injection  - ADS Override Pull        Note to Pharmacy: Created by cabinet override    03/23/25 1114    03/23/25 1114  etomidate (AMIDATE) injection  Code / Trauma / Sedation Medication         03/23/25 1841    03/23/25 1108  EPINEPHrine (ADRENALIN) injection  Code / Trauma / Sedation Medication         03/23/25 1839    03/23/25 1036  Code Status and Medical Interventions: CPR (Attempt to Resuscitate); Full Support  Continuous,   Status:  Canceled         03/23/25 1035    03/23/25 1036  Vital Signs, Site Check & Distal Extremity Check for Warmth, Color, Sensation & Pulses  Per Order Details,   Status:  Canceled         03/23/25 1035    03/23/25 1036  Continuous Cardiac Monitoring  Continuous,   Status:  Canceled        Comments: Follow Standing Orders As Outlined in Process Instructions (Open Order Report to View Full Instructions)    03/23/25 1035    03/23/25 1036  Maintain IV Access  Continuous,   Status:  Canceled         03/23/25 1035    03/23/25 1036  Telemetry - Place Orders & Notify Provider of Results When Patient Experiences Acute Chest Pain, Dysrhythmia or Respiratory Distress  Continuous,   Status:  Canceled         Comments: Open Order Report to View Parameters Requiring Provider Notification    03/23/25 1035 03/23/25 1036  May Be Off Telemetry for Tests  Continuous,   Status:  Canceled         03/23/25 1035 03/23/25 1036  Encourage Fluids  Until Discontinued,   Status:  Canceled         03/23/25 1035 03/23/25 1036  Continuous Pulse Oximetry  Continuous,   Status:  Canceled         03/23/25 1035 03/23/25 1036  Advance Diet As Tolerated -  Until Discontinued,   Status:  Canceled         03/23/25 1035 03/23/25 1036  Notify MD if platelet count is less than 100,000, is less than 1/2 baseline, or if Hgb drops by more than 3mg/dl.  Until Discontinued,   Status:  Canceled         03/23/25 1035 03/23/25 1036  Notify MD of hypotension (SBP less than 95), bleeding, or dysrythmia and follow Sheath Removal Policy if needed.  Continuous,   Status:  Canceled         03/23/25 1035 03/23/25 1036  Cardiac Rehab Evaluation and Enrollment  Once,   Status:  Canceled        Provider:  (Not yet assigned)    03/23/25 1035 03/23/25 1036  Hold metFORMIN (GLUCOPHAGE) for 48 Hours  Continuous,   Status:  Canceled         03/23/25 1035 03/23/25 1036  Closure Device Used  Once,   Status:  Canceled         03/23/25 1035 03/23/25 1036  Assess Puncture Site, Vital Signs, Distal Pulses & Observe Site for Bleeding or Swelling  Per Order Details,   Status:  Canceled        Comments: Every 15 Minutes x4, Every 30 Minutes x4, & Every 1 Hour x2    03/23/25 1035 03/23/25 1036  Diet: Cardiac; Healthy Heart (2-3 Na+); Fluid Consistency: Thin (IDDSI 0)  Diet Effective Now,   Status:  Canceled         03/23/25 1035 03/23/25 1035  nitroglycerin (NITROSTAT) SL tablet 0.4 mg  Every 5 Minutes PRN,   Status:  Discontinued         03/23/25 1035 03/23/25 1035  Change Site Dressing  As Needed,   Status:  Canceled       03/23/25 1035 03/23/25 1035  Oxygen Therapy- Nasal Cannula; Titrate 1-6 LPM Per SpO2; 90 - 95%  Continuous PRN,    "Status:  Canceled       03/23/25 1035    03/23/25 1035  acetaminophen (TYLENOL) tablet 650 mg  Every 4 Hours PRN,   Status:  Discontinued         03/23/25 1035    03/23/25 1035  Head of Bed: Flat; 2 Hours; Elevate Head of Bed: 30 Degrees; Keep Affected Extremity/ Extremities Straight; Total Bedrest Time? Other; Length of Time: 3 hrs  As Needed,   Status:  Canceled       03/23/25 1035    03/23/25 0900  metoprolol tartrate (LOPRESSOR) tablet 12.5 mg  Every 12 Hours Scheduled,   Status:  Discontinued         03/23/25 0843    03/23/25 0845  atorvastatin (LIPITOR) tablet 40 mg  Nightly,   Status:  Discontinued         03/23/25 0843    03/23/25 0845  amiodarone 360 mg in 200 mL D5W infusion  Continuous,   Status:  Discontinued        Placed in \"Followed by\" Linked Group    03/23/25 0843    03/23/25 0845  Inpatient Hospitalist Consult  Once,   Status:  Canceled        Specialty:  Hospitalist  Provider:  Scar Agarwal MD    03/23/25 0844    03/23/25 0844  amiodarone 150 mg in 100 mL D5W (loading dose)  Once        Placed in \"Followed by\" Linked Group    03/23/25 0843    03/23/25 0843  Adult Transthoracic Echo Complete W/ Cont if Necessary Per Protocol  Once,   Status:  Canceled         03/23/25 0843    03/23/25 0839  Inpatient Admission  Once         03/23/25 0843    03/23/25 0828  High Sensitivity Troponin T 1Hr  PROCEDURE ONCE         03/23/25 0806    03/23/25 0827  iopamidol (ISOVUE-370) 76 % injection  Code / Trauma / Sedation Medication,   Status:  Discontinued         03/23/25 0843    03/23/25 0821  eptifibatide (INTEGRILIN) 75 mg in 100 mL solution  Code / Trauma / Sedation Continuous Med         03/23/25 0821    03/23/25 0819  Eptifibatide (INTEGRILIN) injection  Code / Trauma / Sedation Medication,   Status:  Discontinued         03/23/25 0821    03/23/25 0759  heparin (porcine) injection  Code / Trauma / Sedation Medication,   Status:  Discontinued         03/23/25 0800    03/23/25 0749  lidocaine (XYLOCAINE) 2% " injection  Code / Trauma / Sedation Medication,   Status:  Discontinued         03/23/25 0750    03/23/25 0749  sodium chloride 0.9 % infusion  Code / Trauma / Sedation Continuous Med         03/23/25 0749    03/23/25 0741  Cardiac Catheterization/Vascular Study  Once         03/23/25 0740    03/23/25 0739  metoprolol tartrate (LOPRESSOR) injection 2.5 mg  Once         03/23/25 0723    03/23/25 0738  metoprolol tartrate (LOPRESSOR) injection 5 mg  Once,   Status:  Discontinued         03/23/25 0722    03/23/25 0735  ticagrelor (BRILINTA) tablet 180 mg  Once         03/23/25 0719    03/23/25 0726  heparin (porcine) 5000 UNIT/ML injection 3,800 Units  Once         03/23/25 0710    03/23/25 0722  Comprehensive Metabolic Panel  Once         03/23/25 0653    03/23/25 0722  High Sensitivity Troponin T  Once         03/23/25 0653    03/23/25 0709  aspirin chewable tablet 324 mg  Once,   Status:  Discontinued         03/23/25 0653    03/23/25 0704  XR Chest 1 View  1 Time Imaging,   Status:  Canceled         03/23/25 0653    03/23/25 0654  NPO Diet NPO Type: Strict NPO  Diet Effective Now,   Status:  Canceled         03/23/25 0653    03/23/25 0654  Undress and Gown  Once         03/23/25 0653    03/23/25 0654  Cardiac Monitoring  Continuous,   Status:  Canceled        Comments: Follow Standing Orders As Outlined in Process Instructions (Open Order Report to View Full Instructions)    03/23/25 0653    03/23/25 0654  Continuous Pulse Oximetry  Continuous,   Status:  Canceled         03/23/25 0653    03/23/25 0654  ECG 12 Lead ED Triage Standing Order; Chest Pain  Once         03/23/25 0653    03/23/25 0654  XR Chest 2 View  1 Time Imaging,   Status:  Canceled         03/23/25 0653    03/23/25 0654  Insert Peripheral IV  Once,   Status:  Canceled         03/23/25 0653    03/23/25 0654  [place] Draw  Once         03/23/25 0653    03/23/25 0654  CBC & Differential  Once         03/23/25 0653    03/23/25 0654  Green Top (Gel)   PROCEDURE ONCE         03/23/25 0653    03/23/25 0654  Lavender Top  PROCEDURE ONCE         03/23/25 0653    03/23/25 0654  Gold Top - SST  PROCEDURE ONCE         03/23/25 0653    03/23/25 0654  Light Blue Top  PROCEDURE ONCE         03/23/25 0653    03/23/25 0654  CBC Auto Differential  PROCEDURE ONCE         03/23/25 0653    03/23/25 0653  Oxygen Therapy- Nasal Cannula; Titrate 1-6 LPM Per SpO2; 90 - 95%  Continuous PRN,   Status:  Canceled       03/23/25 0653    03/23/25 0653  ECG 12 Lead ED Triage Standing Order; Chest Pain  As Needed,   Status:  Canceled      Comments: Persistent, Unrelieved or Recurrent Chest Pain    03/23/25 0653    03/23/25 0653  sodium chloride 0.9 % flush 10 mL  As Needed,   Status:  Discontinued         03/23/25 0653    03/23/25 0000  Telemetry Scan  Once         03/23/25 0000    03/23/25 0000  Telemetry Scan  Once         03/23/25 0000                     Physician Progress Notes (all)        Scar Agarwal MD at 03/23/25 1208            Ms. Meza is our 85 yo F with hx of HLD, HTN, persistent afib who presented earlier this morning for chest pain. 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 Patient had aspiration thrombectomy and aspiration PTA of distal LAD.  Per report patient was stable after LHC this morning. Patient suddenly became unresponsive after calling out, with blank look on her face per nursing staff. No precipitating signs of symptoms per nursing staff. Code blue called and I responded directly after code was called. Patient was in PEA arrest in first round of compressions. ACLS provided with epinepherine, mag, calcium, sodium bicarb and patient intubated the first couple of cycles. Saturating well, bagging easy per respiratory. Dr. Gupta bedside. Ultrasound showed pericardial effusion and thought to possibly be causing tamponade leading to arrest. Given LAD occlusion appeared to be embolic phenomenon, PE or other  embolic phenomenon considered.  Emergent pericardiocentesis attempted. Patient given IVF.  ROSC obtained. Patient became bradycardic and arrested again. Patient had ROSC a second time. Rhythm during arrests was PEA, bradycardic at times.  After the second Dr. Gupta talked to family and given grim prognosis at this juncture in the arrest resuscitation decided to make her DNR if she arrested again. Patient started on epinepherine gtt, levophed gtt for hypotension and bradycardia. Unfortunately patient became bradycardic again and lost pulse. No activity on bedside echo. I discussed with family and patient made comfort measures as we terminally extubated and removed gtts. I performed death exam after life support was removed with TOD at 12:16 pm.     Electronically signed by Scar Agarwal MD at 03/23/25 1315       Diana Gupta MD at 03/23/25 1159          Responded to CODE BLUE.  Patien became unresponsive suddenly and went into a PEA rhythm.  ACLS protocol was initiated.  She was not hypotensive before the cardiac arrest.  After 30 minutes of CPR patient had ROSC.  She was intubated during the procedure.  She subsequently became bradycardic and had a cardiac arrest again.  CPR was initiated and after 10 to 15 minutes there was ROSC.  She was on epinephrine drip and Levophed.  She received several rounds of epinephrine and atropine.  She also received bicarb.  On bedside ultrasound there appeared to be a pericardial effusion.  Bedside emergent pericardiocentesis was attempted during the code.  At this point the differential diagnosis was PEA arrest due to pulmonary embolism or CVA.  Unlikely to be secondary to pericardial effusion as the effusion was small and patient did not become hypotensive before the cardiac arrest.  I had multiple discussions with the patient's family and they made her DNR.  No further CPR if patient were to go into cardiac arrest.  Patient will be maintained on current  medications.    Electronically signed by Diana Gupta MD at 03/23/25 1205       Consult Notes (all)    No notes of this type exist for this encounter.

## (undated) DEVICE — KT CATH INDIGO RX ASP 140CM

## (undated) DEVICE — MODEL AT P65, P/N 701554-001KIT CONTENTS: HAND CONTROLLER, 3-WAY HIGH-PRESSURE STOPCOCK WITH ROTATING END AND PREMIUM HIGH-PRESSURE TUBING: Brand: ANGIOTOUCH® KIT

## (undated) DEVICE — PERCLOSE™ PROSTYLE™ SUTURE-MEDIATED CLOSURE AND REPAIR SYSTEM: Brand: PERCLOSE™ PROSTYLE™

## (undated) DEVICE — 6F .070 XB 3.5 ECO PK: Brand: VISTA BRITE TIP

## (undated) DEVICE — ADULT DISPOSABLE SINGLE-PATIENT USE PULSE OXIMETER SENSOR: Brand: NONIN

## (undated) DEVICE — CATH F5 INF JL 4 100CM: Brand: INFINITI

## (undated) DEVICE — ST INF PRI SMRTSTE 20DRP 2VLV 24ML 117

## (undated) DEVICE — Device: Brand: ASAHI SION BLUE

## (undated) DEVICE — DEV INFL MONARCH 25W

## (undated) DEVICE — ST EXT IV SMRTSTE 2VLV FIX M LL 6ML 41

## (undated) DEVICE — A2000 MULTI-USE SYRINGE KIT, P/N 701277-003KIT CONTENTS: 100ML CONTRAST RESERVOIR AND TUBING WITH CONTRAST SPIKE AND CLAMP: Brand: A2000 MULTI-USE SYRINGE KIT

## (undated) DEVICE — COPILOT BLEEDBACK CONTROL VALVE: Brand: COPILOT

## (undated) DEVICE — CANSTR COL ENGINE FOR INDIGO SYS

## (undated) DEVICE — KT MANIFLD NAMIC HEART/LT INTERGRATED/COMPENSATOR W/SYR/10ML

## (undated) DEVICE — GW INQW FIX/CORE PTFE J/3MM .035 260CM

## (undated) DEVICE — DRSNG SURESITE WNDW 4X4.5

## (undated) DEVICE — PINNACLE INTRODUCER SHEATH: Brand: PINNACLE

## (undated) DEVICE — CATH F5 INF JR 4 100CM: Brand: INFINITI

## (undated) DEVICE — MINI TREK CORONARY DILATATION CATHETER 2.0 MM X 15 MM / RAPID-EXCHANGE: Brand: MINI TREK

## (undated) DEVICE — LN INJ CONTRST FLXCIL HP F/M LL 1200PSI10

## (undated) DEVICE — LN FLTR ORL/NASL MICROSTREAM NONINTUB A/LNG